# Patient Record
Sex: FEMALE | Race: WHITE | NOT HISPANIC OR LATINO | Employment: FULL TIME | URBAN - METROPOLITAN AREA
[De-identification: names, ages, dates, MRNs, and addresses within clinical notes are randomized per-mention and may not be internally consistent; named-entity substitution may affect disease eponyms.]

---

## 2017-01-30 ENCOUNTER — AMBULATORY - HEALTHEAST (OUTPATIENT)
Dept: LAB | Facility: CLINIC | Age: 37
End: 2017-01-30

## 2017-01-30 ENCOUNTER — COMMUNICATION - HEALTHEAST (OUTPATIENT)
Dept: INTERNAL MEDICINE | Facility: CLINIC | Age: 37
End: 2017-01-30

## 2017-01-30 DIAGNOSIS — R89.4 OTHER AND UNSPECIFIED NONSPECIFIC IMMUNOLOGICAL FINDINGS: ICD-10-CM

## 2017-01-30 DIAGNOSIS — D68.59 PRIMARY HYPERCOAGULABLE STATE (H): ICD-10-CM

## 2017-03-06 ENCOUNTER — COMMUNICATION - HEALTHEAST (OUTPATIENT)
Dept: INTERNAL MEDICINE | Facility: CLINIC | Age: 37
End: 2017-03-06

## 2017-03-06 ENCOUNTER — AMBULATORY - HEALTHEAST (OUTPATIENT)
Dept: LAB | Facility: CLINIC | Age: 37
End: 2017-03-06

## 2017-03-06 DIAGNOSIS — R89.4 OTHER AND UNSPECIFIED NONSPECIFIC IMMUNOLOGICAL FINDINGS: ICD-10-CM

## 2017-03-06 DIAGNOSIS — D68.59 PRIMARY HYPERCOAGULABLE STATE (H): ICD-10-CM

## 2017-03-20 ENCOUNTER — AMBULATORY - HEALTHEAST (OUTPATIENT)
Dept: LAB | Facility: CLINIC | Age: 37
End: 2017-03-20

## 2017-03-20 ENCOUNTER — COMMUNICATION - HEALTHEAST (OUTPATIENT)
Dept: INTERNAL MEDICINE | Facility: CLINIC | Age: 37
End: 2017-03-20

## 2017-03-20 DIAGNOSIS — R89.4 OTHER AND UNSPECIFIED NONSPECIFIC IMMUNOLOGICAL FINDINGS: ICD-10-CM

## 2017-03-20 DIAGNOSIS — D68.59 PRIMARY HYPERCOAGULABLE STATE (H): ICD-10-CM

## 2017-04-10 ENCOUNTER — COMMUNICATION - HEALTHEAST (OUTPATIENT)
Dept: INTERNAL MEDICINE | Facility: CLINIC | Age: 37
End: 2017-04-10

## 2017-04-10 ENCOUNTER — AMBULATORY - HEALTHEAST (OUTPATIENT)
Dept: LAB | Facility: CLINIC | Age: 37
End: 2017-04-10

## 2017-04-10 DIAGNOSIS — R89.4 OTHER AND UNSPECIFIED NONSPECIFIC IMMUNOLOGICAL FINDINGS: ICD-10-CM

## 2017-04-10 DIAGNOSIS — D68.59 PRIMARY HYPERCOAGULABLE STATE (H): ICD-10-CM

## 2017-05-19 ENCOUNTER — AMBULATORY - HEALTHEAST (OUTPATIENT)
Dept: LAB | Facility: CLINIC | Age: 37
End: 2017-05-19

## 2017-05-19 ENCOUNTER — COMMUNICATION - HEALTHEAST (OUTPATIENT)
Dept: INTERNAL MEDICINE | Facility: CLINIC | Age: 37
End: 2017-05-19

## 2017-05-19 DIAGNOSIS — R89.4 OTHER AND UNSPECIFIED NONSPECIFIC IMMUNOLOGICAL FINDINGS: ICD-10-CM

## 2017-05-19 DIAGNOSIS — D68.59 PRIMARY HYPERCOAGULABLE STATE (H): ICD-10-CM

## 2017-06-04 ENCOUNTER — COMMUNICATION - HEALTHEAST (OUTPATIENT)
Dept: INTERNAL MEDICINE | Facility: CLINIC | Age: 37
End: 2017-06-04

## 2017-06-04 DIAGNOSIS — D68.59 PRIMARY HYPERCOAGULABLE STATE (H): ICD-10-CM

## 2017-06-04 DIAGNOSIS — R89.4 OTHER AND UNSPECIFIED NONSPECIFIC IMMUNOLOGICAL FINDINGS: ICD-10-CM

## 2017-06-26 ENCOUNTER — COMMUNICATION - HEALTHEAST (OUTPATIENT)
Dept: INTERNAL MEDICINE | Facility: CLINIC | Age: 37
End: 2017-06-26

## 2017-06-26 ENCOUNTER — AMBULATORY - HEALTHEAST (OUTPATIENT)
Dept: LAB | Facility: CLINIC | Age: 37
End: 2017-06-26

## 2017-06-26 DIAGNOSIS — D68.59 PRIMARY HYPERCOAGULABLE STATE (H): ICD-10-CM

## 2017-06-26 DIAGNOSIS — R89.4 OTHER AND UNSPECIFIED NONSPECIFIC IMMUNOLOGICAL FINDINGS: ICD-10-CM

## 2017-07-12 ENCOUNTER — RECORDS - HEALTHEAST (OUTPATIENT)
Dept: ADMINISTRATIVE | Facility: OTHER | Age: 37
End: 2017-07-12

## 2017-07-25 ENCOUNTER — COMMUNICATION - HEALTHEAST (OUTPATIENT)
Dept: INTERNAL MEDICINE | Facility: CLINIC | Age: 37
End: 2017-07-25

## 2017-07-25 DIAGNOSIS — R89.4 OTHER AND UNSPECIFIED NONSPECIFIC IMMUNOLOGICAL FINDINGS: ICD-10-CM

## 2017-07-25 DIAGNOSIS — D68.59 PRIMARY HYPERCOAGULABLE STATE (H): ICD-10-CM

## 2017-07-31 ENCOUNTER — COMMUNICATION - HEALTHEAST (OUTPATIENT)
Dept: INTERNAL MEDICINE | Facility: CLINIC | Age: 37
End: 2017-07-31

## 2017-07-31 ENCOUNTER — AMBULATORY - HEALTHEAST (OUTPATIENT)
Dept: LAB | Facility: CLINIC | Age: 37
End: 2017-07-31

## 2017-07-31 DIAGNOSIS — D68.59 PRIMARY HYPERCOAGULABLE STATE (H): ICD-10-CM

## 2017-07-31 DIAGNOSIS — R89.4 OTHER AND UNSPECIFIED NONSPECIFIC IMMUNOLOGICAL FINDINGS: ICD-10-CM

## 2017-07-31 DIAGNOSIS — F33.1 MAJOR DEPRESSIVE DISORDER, RECURRENT EPISODE, MODERATE (H): ICD-10-CM

## 2017-07-31 LAB
CHOLEST SERPL-MCNC: 253 MG/DL
FASTING STATUS PATIENT QL REPORTED: YES
HDLC SERPL-MCNC: 63 MG/DL
LDLC SERPL CALC-MCNC: 169 MG/DL
TRIGL SERPL-MCNC: 107 MG/DL

## 2017-08-17 ENCOUNTER — AMBULATORY - HEALTHEAST (OUTPATIENT)
Dept: LAB | Facility: CLINIC | Age: 37
End: 2017-08-17

## 2017-08-17 ENCOUNTER — COMMUNICATION - HEALTHEAST (OUTPATIENT)
Dept: NURSING | Facility: CLINIC | Age: 37
End: 2017-08-17

## 2017-08-17 DIAGNOSIS — D68.59 PRIMARY HYPERCOAGULABLE STATE (H): ICD-10-CM

## 2017-08-17 DIAGNOSIS — R89.4 OTHER AND UNSPECIFIED NONSPECIFIC IMMUNOLOGICAL FINDINGS: ICD-10-CM

## 2017-08-30 ENCOUNTER — COMMUNICATION - HEALTHEAST (OUTPATIENT)
Dept: INTERNAL MEDICINE | Facility: CLINIC | Age: 37
End: 2017-08-30

## 2017-08-30 ENCOUNTER — OFFICE VISIT - HEALTHEAST (OUTPATIENT)
Dept: INTERNAL MEDICINE | Facility: CLINIC | Age: 37
End: 2017-08-30

## 2017-08-30 DIAGNOSIS — R89.4 OTHER AND UNSPECIFIED NONSPECIFIC IMMUNOLOGICAL FINDINGS: ICD-10-CM

## 2017-08-30 DIAGNOSIS — D68.59 PRIMARY HYPERCOAGULABLE STATE (H): ICD-10-CM

## 2017-08-30 DIAGNOSIS — F17.200 TOBACCO DEPENDENCE: ICD-10-CM

## 2017-08-30 ASSESSMENT — MIFFLIN-ST. JEOR: SCORE: 1298.66

## 2017-09-28 ENCOUNTER — COMMUNICATION - HEALTHEAST (OUTPATIENT)
Dept: INTERNAL MEDICINE | Facility: CLINIC | Age: 37
End: 2017-09-28

## 2017-10-04 ENCOUNTER — COMMUNICATION - HEALTHEAST (OUTPATIENT)
Dept: INTERNAL MEDICINE | Facility: CLINIC | Age: 37
End: 2017-10-04

## 2017-10-04 ENCOUNTER — AMBULATORY - HEALTHEAST (OUTPATIENT)
Dept: LAB | Facility: CLINIC | Age: 37
End: 2017-10-04

## 2017-10-04 DIAGNOSIS — D68.59 PRIMARY HYPERCOAGULABLE STATE (H): ICD-10-CM

## 2017-10-04 DIAGNOSIS — R89.4 OTHER AND UNSPECIFIED NONSPECIFIC IMMUNOLOGICAL FINDINGS: ICD-10-CM

## 2017-10-17 ENCOUNTER — AMBULATORY - HEALTHEAST (OUTPATIENT)
Dept: LAB | Facility: CLINIC | Age: 37
End: 2017-10-17

## 2017-10-17 ENCOUNTER — COMMUNICATION - HEALTHEAST (OUTPATIENT)
Dept: INTERNAL MEDICINE | Facility: CLINIC | Age: 37
End: 2017-10-17

## 2017-10-17 DIAGNOSIS — R89.4 OTHER AND UNSPECIFIED NONSPECIFIC IMMUNOLOGICAL FINDINGS: ICD-10-CM

## 2017-10-17 DIAGNOSIS — D68.59 PRIMARY HYPERCOAGULABLE STATE (H): ICD-10-CM

## 2017-11-14 ENCOUNTER — COMMUNICATION - HEALTHEAST (OUTPATIENT)
Dept: NURSING | Facility: CLINIC | Age: 37
End: 2017-11-14

## 2017-11-22 ENCOUNTER — COMMUNICATION - HEALTHEAST (OUTPATIENT)
Dept: INTERNAL MEDICINE | Facility: CLINIC | Age: 37
End: 2017-11-22

## 2017-11-22 ENCOUNTER — AMBULATORY - HEALTHEAST (OUTPATIENT)
Dept: LAB | Facility: CLINIC | Age: 37
End: 2017-11-22

## 2017-11-22 DIAGNOSIS — R89.4 OTHER AND UNSPECIFIED NONSPECIFIC IMMUNOLOGICAL FINDINGS: ICD-10-CM

## 2017-11-22 DIAGNOSIS — D68.59 PRIMARY HYPERCOAGULABLE STATE (H): ICD-10-CM

## 2018-01-05 ENCOUNTER — COMMUNICATION - HEALTHEAST (OUTPATIENT)
Dept: INTERNAL MEDICINE | Facility: CLINIC | Age: 38
End: 2018-01-05

## 2018-01-05 ENCOUNTER — AMBULATORY - HEALTHEAST (OUTPATIENT)
Dept: LAB | Facility: CLINIC | Age: 38
End: 2018-01-05

## 2018-01-05 DIAGNOSIS — R89.4 OTHER AND UNSPECIFIED NONSPECIFIC IMMUNOLOGICAL FINDINGS: ICD-10-CM

## 2018-01-05 DIAGNOSIS — D68.59 PRIMARY HYPERCOAGULABLE STATE (H): ICD-10-CM

## 2018-01-05 LAB — INR PPP: 2.7 (ref 0.9–1.1)

## 2018-02-07 ENCOUNTER — RECORDS - HEALTHEAST (OUTPATIENT)
Dept: ADMINISTRATIVE | Facility: OTHER | Age: 38
End: 2018-02-07

## 2018-02-12 ENCOUNTER — AMBULATORY - HEALTHEAST (OUTPATIENT)
Dept: LAB | Facility: CLINIC | Age: 38
End: 2018-02-12

## 2018-02-12 DIAGNOSIS — F50.9 ATYPICAL ANOREXIA NERVOSA: ICD-10-CM

## 2018-02-14 ENCOUNTER — AMBULATORY - HEALTHEAST (OUTPATIENT)
Dept: LAB | Facility: CLINIC | Age: 38
End: 2018-02-14

## 2018-02-14 ENCOUNTER — COMMUNICATION - HEALTHEAST (OUTPATIENT)
Dept: INTERNAL MEDICINE | Facility: CLINIC | Age: 38
End: 2018-02-14

## 2018-02-14 DIAGNOSIS — R89.4 OTHER AND UNSPECIFIED NONSPECIFIC IMMUNOLOGICAL FINDINGS: ICD-10-CM

## 2018-02-14 DIAGNOSIS — D68.59 PRIMARY HYPERCOAGULABLE STATE (H): ICD-10-CM

## 2018-02-14 DIAGNOSIS — F50.9 ATYPICAL ANOREXIA NERVOSA: ICD-10-CM

## 2018-02-14 LAB
ALBUMIN SERPL-MCNC: 4 G/DL (ref 3.5–5)
ALP SERPL-CCNC: 60 U/L (ref 45–120)
ALT SERPL W P-5'-P-CCNC: 14 U/L (ref 0–45)
ANION GAP SERPL CALCULATED.3IONS-SCNC: 6 MMOL/L (ref 5–18)
AST SERPL W P-5'-P-CCNC: 15 U/L (ref 0–40)
BASOPHILS # BLD AUTO: 0 THOU/UL (ref 0–0.2)
BASOPHILS NFR BLD AUTO: 0 % (ref 0–2)
BILIRUB SERPL-MCNC: 0.3 MG/DL (ref 0–1)
BUN SERPL-MCNC: 17 MG/DL (ref 8–22)
CALCIUM SERPL-MCNC: 9.4 MG/DL (ref 8.5–10.5)
CHLORIDE BLD-SCNC: 105 MMOL/L (ref 98–107)
CO2 SERPL-SCNC: 28 MMOL/L (ref 22–31)
CREAT SERPL-MCNC: 0.73 MG/DL (ref 0.6–1.1)
EOSINOPHIL # BLD AUTO: 0.1 THOU/UL (ref 0–0.4)
EOSINOPHIL NFR BLD AUTO: 3 % (ref 0–6)
ERYTHROCYTE [DISTWIDTH] IN BLOOD BY AUTOMATED COUNT: 12.1 % (ref 11–14.5)
GFR SERPL CREATININE-BSD FRML MDRD: >60 ML/MIN/1.73M2
GLUCOSE BLD-MCNC: 76 MG/DL (ref 70–125)
HCT VFR BLD AUTO: 38.8 % (ref 35–47)
HGB BLD-MCNC: 13.3 G/DL (ref 12–16)
INR PPP: 2.5 (ref 0.9–1.1)
LYMPHOCYTES # BLD AUTO: 1.4 THOU/UL (ref 0.8–4.4)
LYMPHOCYTES NFR BLD AUTO: 31 % (ref 20–40)
MAGNESIUM SERPL-MCNC: 1.8 MG/DL (ref 1.8–2.6)
MCH RBC QN AUTO: 29.2 PG (ref 27–34)
MCHC RBC AUTO-ENTMCNC: 34.2 G/DL (ref 32–36)
MCV RBC AUTO: 85 FL (ref 80–100)
MONOCYTES # BLD AUTO: 0.3 THOU/UL (ref 0–0.9)
MONOCYTES NFR BLD AUTO: 7 % (ref 2–10)
NEUTROPHILS # BLD AUTO: 2.7 THOU/UL (ref 2–7.7)
NEUTROPHILS NFR BLD AUTO: 59 % (ref 50–70)
PLATELET # BLD AUTO: 161 THOU/UL (ref 140–440)
PMV BLD AUTO: 7.4 FL (ref 7–10)
POTASSIUM BLD-SCNC: 4.3 MMOL/L (ref 3.5–5)
PROT SERPL-MCNC: 6.8 G/DL (ref 6–8)
RBC # BLD AUTO: 4.55 MILL/UL (ref 3.8–5.4)
SODIUM SERPL-SCNC: 139 MMOL/L (ref 136–145)
T3FREE SERPL-MCNC: 3 PG/ML (ref 1.9–3.9)
T4 FREE SERPL-MCNC: 1 NG/DL (ref 0.7–1.8)
TSH SERPL DL<=0.005 MIU/L-ACNC: 1.07 UIU/ML (ref 0.3–5)
VIT B12 SERPL-MCNC: 433 PG/ML (ref 213–816)
WBC: 4.6 THOU/UL (ref 4–11)

## 2018-02-15 LAB — 25(OH)D3 SERPL-MCNC: 21.2 NG/ML (ref 30–80)

## 2018-04-08 ENCOUNTER — COMMUNICATION - HEALTHEAST (OUTPATIENT)
Dept: INTERNAL MEDICINE | Facility: CLINIC | Age: 38
End: 2018-04-08

## 2018-04-08 DIAGNOSIS — D68.59 PRIMARY HYPERCOAGULABLE STATE (H): ICD-10-CM

## 2018-04-11 ENCOUNTER — AMBULATORY - HEALTHEAST (OUTPATIENT)
Dept: LAB | Facility: CLINIC | Age: 38
End: 2018-04-11

## 2018-04-11 ENCOUNTER — COMMUNICATION - HEALTHEAST (OUTPATIENT)
Dept: ANTICOAGULATION | Facility: CLINIC | Age: 38
End: 2018-04-11

## 2018-04-11 DIAGNOSIS — D68.59 PRIMARY HYPERCOAGULABLE STATE (H): ICD-10-CM

## 2018-04-11 DIAGNOSIS — R89.4 OTHER AND UNSPECIFIED NONSPECIFIC IMMUNOLOGICAL FINDINGS: ICD-10-CM

## 2018-04-11 LAB — INR PPP: 2.9 (ref 0.9–1.1)

## 2018-05-23 ENCOUNTER — AMBULATORY - HEALTHEAST (OUTPATIENT)
Dept: LAB | Facility: CLINIC | Age: 38
End: 2018-05-23

## 2018-05-23 ENCOUNTER — COMMUNICATION - HEALTHEAST (OUTPATIENT)
Dept: ANTICOAGULATION | Facility: CLINIC | Age: 38
End: 2018-05-23

## 2018-05-23 DIAGNOSIS — R89.4 OTHER AND UNSPECIFIED NONSPECIFIC IMMUNOLOGICAL FINDINGS: ICD-10-CM

## 2018-05-23 DIAGNOSIS — D68.59 PRIMARY HYPERCOAGULABLE STATE (H): ICD-10-CM

## 2018-05-23 LAB — INR PPP: 2.4 (ref 0.9–1.1)

## 2018-06-19 ENCOUNTER — COMMUNICATION - HEALTHEAST (OUTPATIENT)
Dept: ANTICOAGULATION | Facility: CLINIC | Age: 38
End: 2018-06-19

## 2018-06-19 DIAGNOSIS — D68.61 ANTIPHOSPHOLIPID ANTIBODY SYNDROME (H): ICD-10-CM

## 2018-06-19 DIAGNOSIS — D68.59 PRIMARY HYPERCOAGULABLE STATE (H): ICD-10-CM

## 2018-06-25 ENCOUNTER — COMMUNICATION - HEALTHEAST (OUTPATIENT)
Dept: LAB | Facility: CLINIC | Age: 38
End: 2018-06-25

## 2018-06-27 ENCOUNTER — AMBULATORY - HEALTHEAST (OUTPATIENT)
Dept: LAB | Facility: CLINIC | Age: 38
End: 2018-06-27

## 2018-06-27 ENCOUNTER — COMMUNICATION - HEALTHEAST (OUTPATIENT)
Dept: INTERNAL MEDICINE | Facility: CLINIC | Age: 38
End: 2018-06-27

## 2018-06-27 ENCOUNTER — COMMUNICATION - HEALTHEAST (OUTPATIENT)
Dept: ANTICOAGULATION | Facility: CLINIC | Age: 38
End: 2018-06-27

## 2018-06-27 DIAGNOSIS — D68.61 ANTIPHOSPHOLIPID ANTIBODY SYNDROME (H): ICD-10-CM

## 2018-06-27 DIAGNOSIS — D68.59 PRIMARY HYPERCOAGULABLE STATE (H): ICD-10-CM

## 2018-06-27 LAB — INR PPP: 2.6 (ref 0.9–1.1)

## 2018-08-15 ENCOUNTER — COMMUNICATION - HEALTHEAST (OUTPATIENT)
Dept: ANTICOAGULATION | Facility: CLINIC | Age: 38
End: 2018-08-15

## 2018-08-15 ENCOUNTER — AMBULATORY - HEALTHEAST (OUTPATIENT)
Dept: LAB | Facility: CLINIC | Age: 38
End: 2018-08-15

## 2018-08-15 DIAGNOSIS — D68.59 PRIMARY HYPERCOAGULABLE STATE (H): ICD-10-CM

## 2018-08-15 DIAGNOSIS — D68.61 ANTIPHOSPHOLIPID ANTIBODY SYNDROME (H): ICD-10-CM

## 2018-08-15 LAB — INR PPP: 3.2 (ref 0.9–1.1)

## 2018-09-06 ENCOUNTER — COMMUNICATION - HEALTHEAST (OUTPATIENT)
Dept: ANTICOAGULATION | Facility: CLINIC | Age: 38
End: 2018-09-06

## 2018-09-07 ENCOUNTER — AMBULATORY - HEALTHEAST (OUTPATIENT)
Dept: LAB | Facility: CLINIC | Age: 38
End: 2018-09-07

## 2018-09-07 ENCOUNTER — COMMUNICATION - HEALTHEAST (OUTPATIENT)
Dept: ANTICOAGULATION | Facility: CLINIC | Age: 38
End: 2018-09-07

## 2018-09-07 DIAGNOSIS — D68.59 PRIMARY HYPERCOAGULABLE STATE (H): ICD-10-CM

## 2018-09-07 DIAGNOSIS — D68.61 ANTIPHOSPHOLIPID ANTIBODY SYNDROME (H): ICD-10-CM

## 2018-09-07 LAB — INR PPP: 2.3 (ref 0.9–1.1)

## 2018-10-03 ENCOUNTER — COMMUNICATION - HEALTHEAST (OUTPATIENT)
Dept: ANTICOAGULATION | Facility: CLINIC | Age: 38
End: 2018-10-03

## 2018-10-03 ENCOUNTER — AMBULATORY - HEALTHEAST (OUTPATIENT)
Dept: LAB | Facility: CLINIC | Age: 38
End: 2018-10-03

## 2018-10-03 DIAGNOSIS — D68.61 ANTIPHOSPHOLIPID ANTIBODY SYNDROME (H): ICD-10-CM

## 2018-10-03 DIAGNOSIS — D68.59 PRIMARY HYPERCOAGULABLE STATE (H): ICD-10-CM

## 2018-10-03 LAB — INR PPP: 2.3 (ref 0.9–1.1)

## 2018-11-21 ENCOUNTER — COMMUNICATION - HEALTHEAST (OUTPATIENT)
Dept: ANTICOAGULATION | Facility: CLINIC | Age: 38
End: 2018-11-21

## 2018-11-21 ENCOUNTER — OFFICE VISIT - HEALTHEAST (OUTPATIENT)
Dept: INTERNAL MEDICINE | Facility: CLINIC | Age: 38
End: 2018-11-21

## 2018-11-21 DIAGNOSIS — D68.59 PRIMARY HYPERCOAGULABLE STATE (H): ICD-10-CM

## 2018-11-21 DIAGNOSIS — D68.61 ANTIPHOSPHOLIPID ANTIBODY SYNDROME (H): ICD-10-CM

## 2018-11-21 LAB — INR PPP: 2.1 (ref 0.9–1.1)

## 2018-11-21 ASSESSMENT — MIFFLIN-ST. JEOR: SCORE: 1282.28

## 2019-01-14 ENCOUNTER — AMBULATORY - HEALTHEAST (OUTPATIENT)
Dept: LAB | Facility: CLINIC | Age: 39
End: 2019-01-14

## 2019-01-14 ENCOUNTER — COMMUNICATION - HEALTHEAST (OUTPATIENT)
Dept: ANTICOAGULATION | Facility: CLINIC | Age: 39
End: 2019-01-14

## 2019-01-14 DIAGNOSIS — D68.59 PRIMARY HYPERCOAGULABLE STATE (H): ICD-10-CM

## 2019-01-14 DIAGNOSIS — D68.61 ANTIPHOSPHOLIPID ANTIBODY SYNDROME (H): ICD-10-CM

## 2019-01-14 LAB — INR PPP: 1.7 (ref 0.9–1.1)

## 2019-02-04 ENCOUNTER — COMMUNICATION - HEALTHEAST (OUTPATIENT)
Dept: ANTICOAGULATION | Facility: CLINIC | Age: 39
End: 2019-02-04

## 2019-02-06 ENCOUNTER — COMMUNICATION - HEALTHEAST (OUTPATIENT)
Dept: ANTICOAGULATION | Facility: CLINIC | Age: 39
End: 2019-02-06

## 2019-02-06 ENCOUNTER — AMBULATORY - HEALTHEAST (OUTPATIENT)
Dept: LAB | Facility: CLINIC | Age: 39
End: 2019-02-06

## 2019-02-06 DIAGNOSIS — D68.59 PRIMARY HYPERCOAGULABLE STATE (H): ICD-10-CM

## 2019-02-06 DIAGNOSIS — D68.61 ANTIPHOSPHOLIPID ANTIBODY SYNDROME (H): ICD-10-CM

## 2019-02-06 LAB — INR PPP: 2.4 (ref 0.9–1.1)

## 2019-02-27 ENCOUNTER — AMBULATORY - HEALTHEAST (OUTPATIENT)
Dept: LAB | Facility: CLINIC | Age: 39
End: 2019-02-27

## 2019-02-27 ENCOUNTER — COMMUNICATION - HEALTHEAST (OUTPATIENT)
Dept: ANTICOAGULATION | Facility: CLINIC | Age: 39
End: 2019-02-27

## 2019-02-27 DIAGNOSIS — D68.61 ANTIPHOSPHOLIPID ANTIBODY SYNDROME (H): ICD-10-CM

## 2019-02-27 DIAGNOSIS — D68.59 PRIMARY HYPERCOAGULABLE STATE (H): ICD-10-CM

## 2019-02-27 LAB — INR PPP: 1.7 (ref 0.9–1.1)

## 2019-03-20 ENCOUNTER — COMMUNICATION - HEALTHEAST (OUTPATIENT)
Dept: ANTICOAGULATION | Facility: CLINIC | Age: 39
End: 2019-03-20

## 2019-03-20 ENCOUNTER — AMBULATORY - HEALTHEAST (OUTPATIENT)
Dept: LAB | Facility: CLINIC | Age: 39
End: 2019-03-20

## 2019-03-20 DIAGNOSIS — D68.61 ANTIPHOSPHOLIPID ANTIBODY SYNDROME (H): ICD-10-CM

## 2019-03-20 DIAGNOSIS — D68.59 PRIMARY HYPERCOAGULABLE STATE (H): ICD-10-CM

## 2019-03-20 LAB — INR PPP: 3 (ref 0.9–1.1)

## 2019-04-09 ENCOUNTER — COMMUNICATION - HEALTHEAST (OUTPATIENT)
Dept: INTERNAL MEDICINE | Facility: CLINIC | Age: 39
End: 2019-04-09

## 2019-04-19 ENCOUNTER — COMMUNICATION - HEALTHEAST (OUTPATIENT)
Dept: INTERNAL MEDICINE | Facility: CLINIC | Age: 39
End: 2019-04-19

## 2019-04-24 ENCOUNTER — COMMUNICATION - HEALTHEAST (OUTPATIENT)
Dept: ANTICOAGULATION | Facility: CLINIC | Age: 39
End: 2019-04-24

## 2019-04-24 ENCOUNTER — AMBULATORY - HEALTHEAST (OUTPATIENT)
Dept: LAB | Facility: CLINIC | Age: 39
End: 2019-04-24

## 2019-04-24 DIAGNOSIS — D68.61 ANTIPHOSPHOLIPID ANTIBODY SYNDROME (H): ICD-10-CM

## 2019-04-24 DIAGNOSIS — D68.59 PRIMARY HYPERCOAGULABLE STATE (H): ICD-10-CM

## 2019-04-24 LAB — INR PPP: 2.8 (ref 0.9–1.1)

## 2019-05-29 ENCOUNTER — COMMUNICATION - HEALTHEAST (OUTPATIENT)
Dept: ANTICOAGULATION | Facility: CLINIC | Age: 39
End: 2019-05-29

## 2019-05-29 DIAGNOSIS — D68.59 PRIMARY HYPERCOAGULABLE STATE (H): ICD-10-CM

## 2019-05-29 DIAGNOSIS — D68.61 ANTIPHOSPHOLIPID ANTIBODY SYNDROME (H): ICD-10-CM

## 2019-06-14 ENCOUNTER — COMMUNICATION - HEALTHEAST (OUTPATIENT)
Dept: ANTICOAGULATION | Facility: CLINIC | Age: 39
End: 2019-06-14

## 2019-06-26 ENCOUNTER — AMBULATORY - HEALTHEAST (OUTPATIENT)
Dept: LAB | Facility: CLINIC | Age: 39
End: 2019-06-26

## 2019-06-26 ENCOUNTER — COMMUNICATION - HEALTHEAST (OUTPATIENT)
Dept: ANTICOAGULATION | Facility: CLINIC | Age: 39
End: 2019-06-26

## 2019-06-26 DIAGNOSIS — D68.61 ANTIPHOSPHOLIPID ANTIBODY SYNDROME (H): ICD-10-CM

## 2019-06-26 DIAGNOSIS — D68.59 PRIMARY HYPERCOAGULABLE STATE (H): ICD-10-CM

## 2019-06-26 LAB — INR PPP: 3.9 (ref 0.9–1.1)

## 2019-07-10 ENCOUNTER — COMMUNICATION - HEALTHEAST (OUTPATIENT)
Dept: ANTICOAGULATION | Facility: CLINIC | Age: 39
End: 2019-07-10

## 2019-07-10 ENCOUNTER — AMBULATORY - HEALTHEAST (OUTPATIENT)
Dept: LAB | Facility: CLINIC | Age: 39
End: 2019-07-10

## 2019-07-10 DIAGNOSIS — D68.61 ANTIPHOSPHOLIPID ANTIBODY SYNDROME (H): ICD-10-CM

## 2019-07-10 DIAGNOSIS — D68.59 PRIMARY HYPERCOAGULABLE STATE (H): ICD-10-CM

## 2019-07-10 LAB — INR PPP: 2.2 (ref 0.9–1.1)

## 2019-07-31 ENCOUNTER — COMMUNICATION - HEALTHEAST (OUTPATIENT)
Dept: ANTICOAGULATION | Facility: CLINIC | Age: 39
End: 2019-07-31

## 2019-08-14 ENCOUNTER — AMBULATORY - HEALTHEAST (OUTPATIENT)
Dept: LAB | Facility: CLINIC | Age: 39
End: 2019-08-14

## 2019-08-14 ENCOUNTER — COMMUNICATION - HEALTHEAST (OUTPATIENT)
Dept: ANTICOAGULATION | Facility: CLINIC | Age: 39
End: 2019-08-14

## 2019-08-14 DIAGNOSIS — D68.59 PRIMARY HYPERCOAGULABLE STATE (H): ICD-10-CM

## 2019-08-14 DIAGNOSIS — D68.61 ANTIPHOSPHOLIPID ANTIBODY SYNDROME (H): ICD-10-CM

## 2019-08-14 LAB — INR PPP: 3.8 (ref 0.9–1.1)

## 2019-09-11 ENCOUNTER — AMBULATORY - HEALTHEAST (OUTPATIENT)
Dept: LAB | Facility: CLINIC | Age: 39
End: 2019-09-11

## 2019-09-11 ENCOUNTER — COMMUNICATION - HEALTHEAST (OUTPATIENT)
Dept: ANTICOAGULATION | Facility: CLINIC | Age: 39
End: 2019-09-11

## 2019-09-11 DIAGNOSIS — D68.61 ANTIPHOSPHOLIPID ANTIBODY SYNDROME (H): ICD-10-CM

## 2019-09-11 DIAGNOSIS — D68.59 PRIMARY HYPERCOAGULABLE STATE (H): ICD-10-CM

## 2019-09-11 LAB — INR PPP: 2.4 (ref 0.9–1.1)

## 2019-10-16 ENCOUNTER — COMMUNICATION - HEALTHEAST (OUTPATIENT)
Dept: ANTICOAGULATION | Facility: CLINIC | Age: 39
End: 2019-10-16

## 2019-10-29 ENCOUNTER — COMMUNICATION - HEALTHEAST (OUTPATIENT)
Dept: INTERNAL MEDICINE | Facility: CLINIC | Age: 39
End: 2019-10-29

## 2019-11-01 ENCOUNTER — AMBULATORY - HEALTHEAST (OUTPATIENT)
Dept: LAB | Facility: CLINIC | Age: 39
End: 2019-11-01

## 2019-11-01 ENCOUNTER — COMMUNICATION - HEALTHEAST (OUTPATIENT)
Dept: ANTICOAGULATION | Facility: CLINIC | Age: 39
End: 2019-11-01

## 2019-11-01 DIAGNOSIS — D68.61 ANTIPHOSPHOLIPID ANTIBODY SYNDROME (H): ICD-10-CM

## 2019-11-01 DIAGNOSIS — D68.59 PRIMARY HYPERCOAGULABLE STATE (H): ICD-10-CM

## 2019-11-01 LAB — INR PPP: 2.7 (ref 0.9–1.1)

## 2019-11-29 ENCOUNTER — COMMUNICATION - HEALTHEAST (OUTPATIENT)
Dept: INTERNAL MEDICINE | Facility: CLINIC | Age: 39
End: 2019-11-29

## 2019-12-13 ENCOUNTER — COMMUNICATION - HEALTHEAST (OUTPATIENT)
Dept: ANTICOAGULATION | Facility: CLINIC | Age: 39
End: 2019-12-13

## 2019-12-23 ENCOUNTER — COMMUNICATION - HEALTHEAST (OUTPATIENT)
Dept: ANTICOAGULATION | Facility: CLINIC | Age: 39
End: 2019-12-23

## 2019-12-23 ENCOUNTER — AMBULATORY - HEALTHEAST (OUTPATIENT)
Dept: LAB | Facility: CLINIC | Age: 39
End: 2019-12-23

## 2019-12-23 DIAGNOSIS — D68.59 PRIMARY HYPERCOAGULABLE STATE (H): ICD-10-CM

## 2019-12-23 DIAGNOSIS — D68.61 ANTIPHOSPHOLIPID ANTIBODY SYNDROME (H): ICD-10-CM

## 2019-12-23 LAB — INR PPP: 2.5 (ref 0.9–1.1)

## 2020-02-19 ENCOUNTER — AMBULATORY - HEALTHEAST (OUTPATIENT)
Dept: LAB | Facility: CLINIC | Age: 40
End: 2020-02-19

## 2020-02-19 ENCOUNTER — COMMUNICATION - HEALTHEAST (OUTPATIENT)
Dept: ANTICOAGULATION | Facility: CLINIC | Age: 40
End: 2020-02-19

## 2020-02-19 DIAGNOSIS — D68.59 PRIMARY HYPERCOAGULABLE STATE (H): ICD-10-CM

## 2020-02-19 DIAGNOSIS — D68.61 ANTIPHOSPHOLIPID ANTIBODY SYNDROME (H): ICD-10-CM

## 2020-02-19 LAB — INR PPP: 4.9 (ref 0.9–1.1)

## 2020-03-04 ENCOUNTER — AMBULATORY - HEALTHEAST (OUTPATIENT)
Dept: LAB | Facility: CLINIC | Age: 40
End: 2020-03-04

## 2020-03-04 ENCOUNTER — COMMUNICATION - HEALTHEAST (OUTPATIENT)
Dept: ANTICOAGULATION | Facility: CLINIC | Age: 40
End: 2020-03-04

## 2020-03-04 DIAGNOSIS — D68.61 ANTIPHOSPHOLIPID ANTIBODY SYNDROME (H): ICD-10-CM

## 2020-03-04 DIAGNOSIS — D68.59 PRIMARY HYPERCOAGULABLE STATE (H): ICD-10-CM

## 2020-03-04 LAB — INR PPP: 2.2 (ref 0.9–1.1)

## 2020-04-08 ENCOUNTER — AMBULATORY - HEALTHEAST (OUTPATIENT)
Dept: LAB | Facility: CLINIC | Age: 40
End: 2020-04-08

## 2020-04-08 ENCOUNTER — COMMUNICATION - HEALTHEAST (OUTPATIENT)
Dept: ANTICOAGULATION | Facility: CLINIC | Age: 40
End: 2020-04-08

## 2020-04-08 DIAGNOSIS — D68.59 PRIMARY HYPERCOAGULABLE STATE (H): ICD-10-CM

## 2020-04-08 DIAGNOSIS — D68.61 ANTIPHOSPHOLIPID ANTIBODY SYNDROME (H): ICD-10-CM

## 2020-04-08 LAB — INR PPP: 2.7 (ref 0.9–1.1)

## 2020-04-15 ENCOUNTER — COMMUNICATION - HEALTHEAST (OUTPATIENT)
Dept: ANTICOAGULATION | Facility: CLINIC | Age: 40
End: 2020-04-15

## 2020-04-16 ENCOUNTER — COMMUNICATION - HEALTHEAST (OUTPATIENT)
Dept: ANTICOAGULATION | Facility: CLINIC | Age: 40
End: 2020-04-16

## 2020-05-12 ENCOUNTER — COMMUNICATION - HEALTHEAST (OUTPATIENT)
Dept: SCHEDULING | Facility: CLINIC | Age: 40
End: 2020-05-12

## 2020-05-13 ENCOUNTER — COMMUNICATION - HEALTHEAST (OUTPATIENT)
Dept: ANTICOAGULATION | Facility: CLINIC | Age: 40
End: 2020-05-13

## 2020-05-26 ENCOUNTER — COMMUNICATION - HEALTHEAST (OUTPATIENT)
Dept: SCHEDULING | Facility: CLINIC | Age: 40
End: 2020-05-26

## 2020-05-27 ENCOUNTER — OFFICE VISIT - HEALTHEAST (OUTPATIENT)
Dept: INTERNAL MEDICINE | Facility: CLINIC | Age: 40
End: 2020-05-27

## 2020-05-27 ENCOUNTER — COMMUNICATION - HEALTHEAST (OUTPATIENT)
Dept: LAB | Facility: CLINIC | Age: 40
End: 2020-05-27

## 2020-05-27 DIAGNOSIS — D68.59 PRIMARY HYPERCOAGULABLE STATE (H): ICD-10-CM

## 2020-05-27 DIAGNOSIS — Z13.220 SCREENING CHOLESTEROL LEVEL: ICD-10-CM

## 2020-05-27 DIAGNOSIS — F41.1 ANXIETY, GENERALIZED: ICD-10-CM

## 2020-05-27 DIAGNOSIS — D68.61 ANTIPHOSPHOLIPID ANTIBODY SYNDROME (H): ICD-10-CM

## 2020-05-27 DIAGNOSIS — F33.9 RECURRENT MAJOR DEPRESSIVE DISORDER, REMISSION STATUS UNSPECIFIED (H): ICD-10-CM

## 2020-05-27 DIAGNOSIS — Z13.1 SCREENING FOR DIABETES MELLITUS: ICD-10-CM

## 2020-05-27 ASSESSMENT — PATIENT HEALTH QUESTIONNAIRE - PHQ9: SUM OF ALL RESPONSES TO PHQ QUESTIONS 1-9: 0

## 2020-06-03 ENCOUNTER — AMBULATORY - HEALTHEAST (OUTPATIENT)
Dept: LAB | Facility: CLINIC | Age: 40
End: 2020-06-03

## 2020-06-03 ENCOUNTER — COMMUNICATION - HEALTHEAST (OUTPATIENT)
Dept: ANTICOAGULATION | Facility: CLINIC | Age: 40
End: 2020-06-03

## 2020-06-03 DIAGNOSIS — D68.61 ANTIPHOSPHOLIPID ANTIBODY SYNDROME (H): ICD-10-CM

## 2020-06-03 DIAGNOSIS — D68.59 PRIMARY HYPERCOAGULABLE STATE (H): ICD-10-CM

## 2020-06-03 LAB — INR PPP: 1.9 (ref 0.9–1.1)

## 2020-06-24 ENCOUNTER — COMMUNICATION - HEALTHEAST (OUTPATIENT)
Dept: ANTICOAGULATION | Facility: CLINIC | Age: 40
End: 2020-06-24

## 2020-07-08 ENCOUNTER — COMMUNICATION - HEALTHEAST (OUTPATIENT)
Dept: ANTICOAGULATION | Facility: CLINIC | Age: 40
End: 2020-07-08

## 2020-07-08 ENCOUNTER — AMBULATORY - HEALTHEAST (OUTPATIENT)
Dept: LAB | Facility: CLINIC | Age: 40
End: 2020-07-08

## 2020-07-08 DIAGNOSIS — D68.61 ANTIPHOSPHOLIPID ANTIBODY SYNDROME (H): ICD-10-CM

## 2020-07-08 DIAGNOSIS — D68.59 PRIMARY HYPERCOAGULABLE STATE (H): ICD-10-CM

## 2020-07-08 LAB — INR PPP: 3.5 (ref 0.9–1.1)

## 2020-08-05 ENCOUNTER — COMMUNICATION - HEALTHEAST (OUTPATIENT)
Dept: ANTICOAGULATION | Facility: CLINIC | Age: 40
End: 2020-08-05

## 2020-08-05 ENCOUNTER — AMBULATORY - HEALTHEAST (OUTPATIENT)
Dept: LAB | Facility: CLINIC | Age: 40
End: 2020-08-05

## 2020-08-05 DIAGNOSIS — D68.59 PRIMARY HYPERCOAGULABLE STATE (H): ICD-10-CM

## 2020-08-05 DIAGNOSIS — D68.61 ANTIPHOSPHOLIPID ANTIBODY SYNDROME (H): ICD-10-CM

## 2020-08-05 LAB — INR PPP: 3 (ref 0.9–1.1)

## 2020-09-15 ENCOUNTER — COMMUNICATION - HEALTHEAST (OUTPATIENT)
Dept: ANTICOAGULATION | Facility: CLINIC | Age: 40
End: 2020-09-15

## 2020-09-23 ENCOUNTER — AMBULATORY - HEALTHEAST (OUTPATIENT)
Dept: LAB | Facility: CLINIC | Age: 40
End: 2020-09-23

## 2020-09-23 ENCOUNTER — COMMUNICATION - HEALTHEAST (OUTPATIENT)
Dept: ANTICOAGULATION | Facility: CLINIC | Age: 40
End: 2020-09-23

## 2020-09-23 DIAGNOSIS — D68.59 PRIMARY HYPERCOAGULABLE STATE (H): ICD-10-CM

## 2020-09-23 DIAGNOSIS — D68.61 ANTIPHOSPHOLIPID ANTIBODY SYNDROME (H): ICD-10-CM

## 2020-09-23 LAB — INR PPP: 2.1 (ref 0.9–1.1)

## 2020-11-12 ENCOUNTER — COMMUNICATION - HEALTHEAST (OUTPATIENT)
Dept: ANTICOAGULATION | Facility: CLINIC | Age: 40
End: 2020-11-12

## 2020-12-02 ENCOUNTER — AMBULATORY - HEALTHEAST (OUTPATIENT)
Dept: LAB | Facility: CLINIC | Age: 40
End: 2020-12-02

## 2020-12-02 ENCOUNTER — COMMUNICATION - HEALTHEAST (OUTPATIENT)
Dept: ANTICOAGULATION | Facility: CLINIC | Age: 40
End: 2020-12-02

## 2020-12-02 DIAGNOSIS — D68.61 ANTIPHOSPHOLIPID ANTIBODY SYNDROME (H): ICD-10-CM

## 2020-12-02 DIAGNOSIS — D68.59 PRIMARY HYPERCOAGULABLE STATE (H): ICD-10-CM

## 2020-12-02 LAB — INR PPP: 3.4 (ref 0.9–1.1)

## 2021-01-06 ENCOUNTER — AMBULATORY - HEALTHEAST (OUTPATIENT)
Dept: LAB | Facility: CLINIC | Age: 41
End: 2021-01-06

## 2021-01-06 ENCOUNTER — AMBULATORY - HEALTHEAST (OUTPATIENT)
Dept: ANTICOAGULATION | Facility: CLINIC | Age: 41
End: 2021-01-06

## 2021-01-06 ENCOUNTER — COMMUNICATION - HEALTHEAST (OUTPATIENT)
Dept: ANTICOAGULATION | Facility: CLINIC | Age: 41
End: 2021-01-06

## 2021-01-06 DIAGNOSIS — D68.61 ANTIPHOSPHOLIPID ANTIBODY SYNDROME (H): ICD-10-CM

## 2021-01-06 DIAGNOSIS — D68.59 PRIMARY HYPERCOAGULABLE STATE (H): ICD-10-CM

## 2021-01-06 LAB — INR PPP: 2.9 (ref 0.9–1.1)

## 2021-02-03 ENCOUNTER — OFFICE VISIT - HEALTHEAST (OUTPATIENT)
Dept: INTERNAL MEDICINE | Facility: CLINIC | Age: 41
End: 2021-02-03

## 2021-02-03 DIAGNOSIS — K52.9 GASTROENTERITIS: ICD-10-CM

## 2021-02-03 ASSESSMENT — MIFFLIN-ST. JEOR: SCORE: 1332.84

## 2021-02-17 ENCOUNTER — COMMUNICATION - HEALTHEAST (OUTPATIENT)
Dept: ANTICOAGULATION | Facility: CLINIC | Age: 41
End: 2021-02-17

## 2021-02-17 ENCOUNTER — AMBULATORY - HEALTHEAST (OUTPATIENT)
Dept: LAB | Facility: CLINIC | Age: 41
End: 2021-02-17

## 2021-02-17 DIAGNOSIS — D68.59 PRIMARY HYPERCOAGULABLE STATE (H): ICD-10-CM

## 2021-02-17 DIAGNOSIS — D68.61 ANTIPHOSPHOLIPID ANTIBODY SYNDROME (H): ICD-10-CM

## 2021-02-17 LAB — INR PPP: 1.7 (ref 0.9–1.1)

## 2021-03-10 ENCOUNTER — COMMUNICATION - HEALTHEAST (OUTPATIENT)
Dept: ANTICOAGULATION | Facility: CLINIC | Age: 41
End: 2021-03-10

## 2021-03-19 ENCOUNTER — COMMUNICATION - HEALTHEAST (OUTPATIENT)
Dept: ANTICOAGULATION | Facility: CLINIC | Age: 41
End: 2021-03-19

## 2021-03-19 ENCOUNTER — AMBULATORY - HEALTHEAST (OUTPATIENT)
Dept: LAB | Facility: CLINIC | Age: 41
End: 2021-03-19

## 2021-03-19 DIAGNOSIS — D68.61 ANTIPHOSPHOLIPID ANTIBODY SYNDROME (H): ICD-10-CM

## 2021-03-19 DIAGNOSIS — D68.59 PRIMARY HYPERCOAGULABLE STATE (H): ICD-10-CM

## 2021-03-19 LAB — INR PPP: 2.6 (ref 0.9–1.1)

## 2021-04-23 ENCOUNTER — COMMUNICATION - HEALTHEAST (OUTPATIENT)
Dept: ANTICOAGULATION | Facility: CLINIC | Age: 41
End: 2021-04-23

## 2021-04-28 ENCOUNTER — AMBULATORY - HEALTHEAST (OUTPATIENT)
Dept: ANTICOAGULATION | Facility: CLINIC | Age: 41
End: 2021-04-28

## 2021-04-28 DIAGNOSIS — D68.59 PRIMARY HYPERCOAGULABLE STATE (H): ICD-10-CM

## 2021-05-04 ENCOUNTER — COMMUNICATION - HEALTHEAST (OUTPATIENT)
Dept: INTERNAL MEDICINE | Facility: CLINIC | Age: 41
End: 2021-05-04

## 2021-05-04 DIAGNOSIS — D68.59 PRIMARY HYPERCOAGULABLE STATE (H): ICD-10-CM

## 2021-05-12 ENCOUNTER — AMBULATORY - HEALTHEAST (OUTPATIENT)
Dept: LAB | Facility: CLINIC | Age: 41
End: 2021-05-12

## 2021-05-12 ENCOUNTER — COMMUNICATION - HEALTHEAST (OUTPATIENT)
Dept: ANTICOAGULATION | Facility: CLINIC | Age: 41
End: 2021-05-12

## 2021-05-12 ENCOUNTER — RECORDS - HEALTHEAST (OUTPATIENT)
Dept: ANTICOAGULATION | Facility: CLINIC | Age: 41
End: 2021-05-12

## 2021-05-12 DIAGNOSIS — D68.59 PRIMARY HYPERCOAGULABLE STATE (H): ICD-10-CM

## 2021-05-12 LAB — INR PPP: 3.2 (ref 0.9–1.1)

## 2021-05-27 ASSESSMENT — PATIENT HEALTH QUESTIONNAIRE - PHQ9: SUM OF ALL RESPONSES TO PHQ QUESTIONS 1-9: 0

## 2021-05-28 NOTE — TELEPHONE ENCOUNTER
ANTICOAGULATION  MANAGEMENT    Assessment     Today's INR result of 2.8   is Therapeutic (goal INR of 2.0-3.0)        Warfarin taken as previously instructed    No new diet changes affecting INR    No new medication/supplements affecting INR    Continues to tolerate warfarin with no reported s/s of bleeding or thromboembolism     Previous INR was Therapeutic    Plan:     Spoke with Jeimy regarding INR result and instructed:     Warfarin Dosing Instructions:  Continue current warfarin dose 15 mg daily on mon; and 10 mg daily rest of week  (0 % change)    Instructed patient to follow up no later than: 4-6 weeks      Instructed to call the [facility] for any changes, questions or concerns. (#[phone])   ?   Karen Rivera RN    Subjective/Objective:      Jeimy Lynch, a 38 y.o. female is on warfarin.     Jeimy reports:     Home warfarin dose: as updated on anticoagulation calendar per template     Missed doses: No     Medication changes:  No     S/S of bleeding or thromboembolism:  No     New Injury or illness:  No     Changes in diet or alcohol consumption:  No     Upcoming surgery, procedure or cardioversion:  No    Anticoagulation Episode Summary     Current INR goal:   2.0-3.0   TTR:   70.7 % (4.3 y)   Next INR check:   5/29/2019   INR from last check:   2.80 (4/24/2019)   Weekly max warfarin dose:      Target end date:      INR check location:      Preferred lab:      Send INR reminders to:   ANTICOAGULATION POOL A (WBY,WBE,MID,RSC)    Indications    Antiphospholipid antibody syndrome (H) [D68.61]  Factor V Leiden Mutation [D68.59]           Comments:            Anticoagulation Care Providers     Provider Role Specialty Phone number    Ayush Juan,   Internal Medicine [phone]

## 2021-05-29 NOTE — TELEPHONE ENCOUNTER
ANTICOAGULATION  MANAGEMENT PROGRAM    Jeimy Lynch is overdue for INR check.  Reminder call made.    Left message for Jeimy. If returning call, please schedule INR check as soon as possible.    Ira uLgo RN

## 2021-05-29 NOTE — TELEPHONE ENCOUNTER
Provider Review: Anticoagulation Annual Referral Renewal    ACM Renewal Decision:  Renew ACM warfarin management      INR Range:   Continue management at current INR goal   Anticipated Duration of Therapy (from today):  Long-term anticoagulation      Ayush Juan DO  6:47 AM

## 2021-05-29 NOTE — TELEPHONE ENCOUNTER
"Anticoagulation Annual Referral Renewal Review    Jeimy Lynch's chart reviewed for annual renewal of referral to anticoagulation monitoring.        Criteria for anticoagulation nurse and/or pharmacist renewal met   Warfarin indication: Hypercoagulable state: antiphospholipid Yes, per indication   Current with INR monitoring/compliant Yes Yes   Date of last office visit 11/21/18 Yes, had office visit within last year   Time in Therapeutic Range (TTR) 58.3 % No, TTR < 60 %       Jeimy Lynch did NOT meet all criteria for anticoagulation management program initiated renewal and requires provider review. Using dot phrase, \".acmrenewalprovider\", please advise if Jeimy's anticoagulation management referral should be renewed or if patient should be seen in office to review anticoagulation therapy      Karen Rivera RN  5:54 PM      "

## 2021-05-30 NOTE — TELEPHONE ENCOUNTER
ANTICOAGULATION  MANAGEMENT    Assessment     Today's INR result of 2.2 is Therapeutic (goal INR of 2.0-3.0)        Warfarin taken as previously instructed    No new diet changes affecting INR    No new medication/supplements affecting INR    Continues to tolerate warfarin with no reported s/s of bleeding or thromboembolism     Previous INR was Supratherapeutic    Plan:     Spoke with Jeimy regarding INR result and instructed:     Warfarin Dosing Instructions:  Continue current warfarin dose 10 mg daily    Instructed patient to follow up no later than: 2 weeks      Jeimy verbalizes understanding and agrees to warfarin dosing plan.    Instructed to call the [facility] for any changes, questions or concerns. (#[phone])   ?   Karen Rivera RN    Subjective/Objective:      Jeimy Lynch, a 39 y.o. female is on warfarin.     Jeimy reports:     Home warfarin dose: as updated on anticoagulation calendar per template     Missed doses: No     Medication changes:  No     S/S of bleeding or thromboembolism:  No     New Injury or illness:  No     Changes in diet or alcohol consumption:  No     Upcoming surgery, procedure or cardioversion:  No    Anticoagulation Episode Summary     Current INR goal:   2.0-3.0   TTR:   68.5 % (4.5 y)   Next INR check:   7/24/2019   INR from last check:   2.20 (7/10/2019)   Weekly max warfarin dose:      Target end date:      INR check location:      Preferred lab:      Send INR reminders to:   [facility]    Indications    Antiphospholipid antibody syndrome (H) [D68.61]  Factor V Leiden Mutation [D68.59]           Comments:            Anticoagulation Care Providers     Provider Role Specialty Phone number    Ayush Juan DO  Internal Medicine [phone]

## 2021-05-30 NOTE — TELEPHONE ENCOUNTER
ANTICOAGULATION  MANAGEMENT    Assessment     Today's INR result of 3.9 is Supratherapeutic (goal INR of 2.0-3.0)        Warfarin taken as previously instructed    Change in alcohol intake may be affecting INR does plan to drink a bit more over summer, will try to add a salad weekly when drinking more    No new medication/supplements affecting INR    Continues to tolerate warfarin with no reported s/s of bleeding or thromboembolism     Previous INR was Therapeutic    Plan:     Spoke with Jeimy regarding INR result and instructed:     Warfarin Dosing Instructions:  hold today then change warfarin dose to 10 mg  (6.7 % change)    Instructed patient to follow up no later than: two weeks      Jeimy verbalizes understanding and agrees to warfarin dosing plan.    Instructed to call the [facility] for any changes, questions or concerns. (#[phone])   ?   Karen Rivera RN    Subjective/Objective:      Jeimy Lynch, a 39 y.o. female is on warfarin.     Jeimy reports:     Home warfarin dose: as updated on anticoagulation calendar per template     Missed doses: No     Medication changes:  No     S/S of bleeding or thromboembolism:  No     New Injury or illness:  No     Changes in diet or alcohol consumption:  No     Upcoming surgery, procedure or cardioversion:  No    Anticoagulation Episode Summary     Current INR goal:   2.0-3.0   TTR:   68.6 % (4.4 y)   Next INR check:   7/10/2019   INR from last check:   3.90! (6/26/2019)   Weekly max warfarin dose:      Target end date:      INR check location:      Preferred lab:      Send INR reminders to:   [facility]    Indications    Antiphospholipid antibody syndrome (H) [D68.61]  Factor V Leiden Mutation [D68.59]           Comments:            Anticoagulation Care Providers     Provider Role Specialty Phone number    Ayush Juan DO  Internal Medicine [phone]

## 2021-05-31 VITALS — HEIGHT: 66 IN | WEIGHT: 133.8 LBS | BODY MASS INDEX: 21.5 KG/M2

## 2021-05-31 NOTE — TELEPHONE ENCOUNTER
ANTICOAGULATION  MANAGEMENT    Assessment     Today's INR result of 3.8 is Supratherapeutic (goal INR of 2.0-3.0)        Warfarin taken as previously instructed    Change in alcohol intake may be affecting INR had more than usual last night    Continues to tolerate warfarin with no reported s/s of bleeding or thromboembolism     Previous INR was Therapeutic    Plan:     Spoke with Jeimy regarding INR result and instructed:     Warfarin Dosing Instructions:  skip today then continue current warfarin dose 10 mg daily     Instructed patient to follow up no later than: two weeks      Jeimy verbalizes understanding and agrees to warfarin dosing plan.    Instructed to call the [facility] for any changes, questions or concerns. (#[phone])   ?   Karen Rivera RN    Subjective/Objective:      Jeimy Lynch, a 39 y.o. female is on warfarin.     Jeimy reports:     Home warfarin dose: as updated on anticoagulation calendar per template     Missed doses: No     Medication changes:  No     S/S of bleeding or thromboembolism:  No     New Injury or illness:  No     Changes in diet or alcohol consumption:  No     Upcoming surgery, procedure or cardioversion:  No    Anticoagulation Episode Summary     Current INR goal:   2.0-3.0   TTR:   68.1 % (4.6 y)   Next INR check:   8/28/2019   INR from last check:   3.80! (8/14/2019)   Weekly max warfarin dose:      Target end date:      INR check location:      Preferred lab:      Send INR reminders to:   [facility]    Indications    Antiphospholipid antibody syndrome (H) [D68.61]  Factor V Leiden Mutation [D68.59]           Comments:            Anticoagulation Care Providers     Provider Role Specialty Phone number    Ayush Juan DO  Internal Medicine [phone]

## 2021-05-31 NOTE — TELEPHONE ENCOUNTER
ANTICOAGULATION  MANAGEMENT PROGRAM    Jeimy Lynch is overdue for INR check.  Reminder call made.    Left message for Jeimy. If returning call, please schedule INR check as soon as possible.    Karen Rivera RN

## 2021-06-01 NOTE — TELEPHONE ENCOUNTER
ANTICOAGULATION  MANAGEMENT    Assessment     Today's INR result of 2.4 is Therapeutic (goal INR of 2.0-3.0)        Warfarin taken as previously instructed    No new diet changes affecting INR    No new medication/supplements affecting INR    Continues to tolerate warfarin with no reported s/s of bleeding or thromboembolism     Previous INR was Supratherapeutic    Plan:     Spoke with Jeimy regarding INR result and instructed:     Warfarin Dosing Instructions:  Continue current warfarin dose 10 mg daily    Instructed patient to follow up no later than: one month      James verbalizes understanding and agrees to warfarin dosing plan.    Instructed to call the [facility] for any changes, questions or concerns. (#[phone])   ?   Karen Rivera RN    Subjective/Objective:      Jeimy Lynch, a 39 y.o. female is on warfarin.     Jeimy reports:     Home warfarin dose: as updated on anticoagulation calendar per template     Missed doses: No     Medication changes:  No     S/S of bleeding or thromboembolism:  No     New Injury or illness:  No     Changes in diet or alcohol consumption:  No     Upcoming surgery, procedure or cardioversion:  No    Anticoagulation Episode Summary     Current INR goal:   2.0-3.0   TTR:   65.2 %   Next INR check:   10/9/2019   INR from last check:   2.40 (9/11/2019)   Weekly max warfarin dose:      Target end date:      INR check location:      Preferred lab:      Send INR reminders to:   [facility]    Indications    Antiphospholipid antibody syndrome (H) [D68.61]  Factor V Leiden Mutation [D68.59]           Comments:            Anticoagulation Care Providers     Provider Role Specialty Phone number    Ayush Juan DO  Internal Medicine [phone]

## 2021-06-02 VITALS — HEIGHT: 66 IN | WEIGHT: 130.19 LBS | BODY MASS INDEX: 20.92 KG/M2

## 2021-06-02 NOTE — TELEPHONE ENCOUNTER
ANTICOAGULATION  MANAGEMENT    Assessment     Today's INR result of 2.7 is Therapeutic (goal INR of 2.0-3.0)        Warfarin taken as previously instructed    No new diet changes affecting INR    No new medication/supplements affecting INR    Continues to tolerate warfarin with no reported s/s of bleeding or thromboembolism     Previous INR was Therapeutic    Plan:     Left a detailed message for Jeimy regarding INR result and instructed:     Warfarin Dosing Instructions:  Continue current warfarin dose 10 mg daily  (0 % change)    Instructed patient to follow up no later than: 5 weeks      Instructed to call the [facility] for any changes, questions or concerns. (#[phone])   ?   Karen Rivera RN    Subjective/Objective:      Jeimy Lynch, a 39 y.o. female is on warfarin.     Jeimy reports:     Home warfarin dose: as updated on anticoagulation calendar per template     Missed doses: No     Medication changes:  No     S/S of bleeding or thromboembolism:  No     New Injury or illness:  No     Changes in diet or alcohol consumption:  No     Upcoming surgery, procedure or cardioversion:  No    Anticoagulation Episode Summary     Current INR goal:   2.0-3.0   TTR:   65.2 %   Next INR check:   12/6/2019   INR from last check:   2.70 (11/1/2019)   Weekly max warfarin dose:      Target end date:      INR check location:      Preferred lab:      Send INR reminders to:   [facility]    Indications    Antiphospholipid antibody syndrome (H) [D68.61]  Factor V Leiden Mutation [D68.59]           Comments:            Anticoagulation Care Providers     Provider Role Specialty Phone number    Ayush Juan DO  Internal Medicine [phone]

## 2021-06-03 ENCOUNTER — COMMUNICATION - HEALTHEAST (OUTPATIENT)
Dept: ANTICOAGULATION | Facility: CLINIC | Age: 41
End: 2021-06-03

## 2021-06-04 NOTE — TELEPHONE ENCOUNTER
ANTICOAGULATION  MANAGEMENT PROGRAM    Jeimy Lynch is overdue for INR check.     Left message to call and schedule INR appointment as soon as possible.      Karen Rivera RN

## 2021-06-04 NOTE — TELEPHONE ENCOUNTER
ANTICOAGULATION  MANAGEMENT    Assessment     Today's INR result of 2.5 is Therapeutic (goal INR of 2.0-3.0)        Warfarin taken as previously instructed    No new diet changes affecting INR    No new medication/supplements affecting INR    Continues to tolerate warfarin with no reported s/s of bleeding or thromboembolism     Previous INR was Therapeutic    Plan:     Spoke with Jeimy regarding INR result and instructed:     Warfarin Dosing Instructions:  Continue current warfarin dose 10 mg daily     Instructed patient to follow up no later than: 6 weeks      Jeimy verbalizes understanding and agrees to warfarin dosing plan.    Instructed to call the [facility] for any changes, questions or concerns. (#[phone])   ?   Karen Rivera RN    Subjective/Objective:      Jeimy Lynhc, a 39 y.o. female is on warfarin.     Jeimy reports:     Home warfarin dose: as updated on anticoagulation calendar per template     Missed doses: No     Medication changes:  No     S/S of bleeding or thromboembolism:  No     New Injury or illness:  No     Changes in diet or alcohol consumption:  No     Upcoming surgery, procedure or cardioversion:  No    Anticoagulation Episode Summary     Current INR goal:   2.0-3.0   TTR:   62.1 % (1 y)   Next INR check:   2/3/2020   INR from last check:   2.50 (12/23/2019)   Weekly max warfarin dose:      Target end date:      INR check location:      Preferred lab:      Send INR reminders to:   [facility]    Indications    Antiphospholipid antibody syndrome (H) [D68.61]  Factor V Leiden Mutation [D68.59]           Comments:            Anticoagulation Care Providers     Provider Role Specialty Phone number    Ayush Juan DO  Internal Medicine [phone]

## 2021-06-05 VITALS
DIASTOLIC BLOOD PRESSURE: 74 MMHG | OXYGEN SATURATION: 97 % | HEIGHT: 66 IN | SYSTOLIC BLOOD PRESSURE: 110 MMHG | BODY MASS INDEX: 22.89 KG/M2 | RESPIRATION RATE: 18 BRPM | WEIGHT: 142.44 LBS | HEART RATE: 102 BPM

## 2021-06-06 NOTE — TELEPHONE ENCOUNTER
"ANTICOAGULATION  MANAGEMENT    Assessment     Today's INR result of 4.9 is Supratherapeutic (goal INR of 2.0-3.0)        Warfarin taken as previously instructed    Decreased greens/vitamin K intake may be affecting INR slight increase in etoh, \"probably has a beer every night\".    No new medication/supplements affecting INR    Continues to tolerate warfarin with no reported s/s of bleeding or thromboembolism     Previous INR was Therapeutic    Plan:     Spoke with Jeimy regarding INR result and instructed:     Warfarin Dosing Instructions:  hold today then change warfarin dose to 5 mg daily on sun/thu; and 10 mg daily rest of week  (14 % change)    Instructed patient to follow up no later than: 7-10 days    Jeimy verbalizes understanding and agrees to warfarin dosing plan.    Instructed to call the [facility] for any changes, questions or concerns. (#[phone])   ?   Karen Rivera RN    Subjective/Objective:      Jeimy Lynch, a 39 y.o. female is on warfarin.     Jeimy reports:     Home warfarin dose: as updated on anticoagulation calendar per template     Missed doses: No     Medication changes:  No     S/S of bleeding or thromboembolism:  No     New Injury or illness:  No     Changes in diet or alcohol consumption:  As above     Upcoming surgery, procedure or cardioversion:  No    Anticoagulation Episode Summary     Current INR goal:   2.0-3.0   TTR:   58.5 % (1 y)   Next INR check:   2/28/2020   INR from last check:   4.90! (2/19/2020)   Weekly max warfarin dose:      Target end date:      INR check location:      Preferred lab:      Send INR reminders to:   [facility]    Indications    Antiphospholipid antibody syndrome (H) [D68.61]  Factor V Leiden Mutation [D68.59]           Comments:            Anticoagulation Care Providers     Provider Role Specialty Phone number    Ayush Juan,   Internal Medicine [phone]        "

## 2021-06-07 NOTE — TELEPHONE ENCOUNTER
Provider Review: Anticoagulation Annual Referral Renewal    ACM Renewal Decision:  Renew ACM warfarin management      INR Range:   Continue management at current INR goal   Anticipated Duration of Therapy (from today):  Long-term anticoagulation      Ayush Juan DO  10:26 AM

## 2021-06-08 NOTE — PROGRESS NOTES
Patient would like the video invitation sent by: Send to e-mail at: [email or web address]      ASSESSMENT:  1. Factor V Leiden Mutation  Refill, no issues, consistent INRs overall at 10 mg warfarin nightly  - warfarin ANTICOAGULANT (COUMADIN/JANTOVEN) 5 MG tablet; TAKE 2 TO 3 TABLETS BY MOUTH DAILY AS DIRECTED  Dispense: 240 tablet; Refill: 3    2. Antiphospholipid antibody syndrome (H)  See above    3. Anxiety, generalized  4. Recurrent major depressive disorder, remission status unspecified (H)  Well maintained, feeling better than even before.  Enjoys working from home    5. Screening cholesterol level  Update   - Glucose; Future    6. Screening for diabetes mellitus  Update, past level high  - Lipid Cooper FASTING; Future        Preventive Health Care:  Gets pap through [facility]    Lab Results   Component Value Date    INR 2.70 (H) 04/08/2020    INR 2.20 (H) 03/04/2020    INR 4.90 (H) 02/19/2020      Takes 10 mg dailymild stress    [facility] 2023    PLAN:  There are no Patient Instructions on file for this visit.    Orders Placed This Encounter   Procedures     Glucose     Standing Status:   Future     Standing Expiration Date:   5/27/2021     Lipid Cooper FASTING     Standing Status:   Future     Standing Expiration Date:   5/27/2021     Order Specific Question:   Fasting is required?     Answer:   Yes     Return in about 1 year (around 5/27/2021) for Annual physical.    CHIEF COMPLAINT:  Chief Complaint   Patient presents with     Follow-up     Warfarin       HISTORY OF PRESENT ILLNESS:  Jeimy is a 39 y.o. female contacting the clinic today via video for routine clinic follow-up.    She has history of hypercoagulable state and has been maintained on warfarin.  She does not have any issues with this.    She works at an online [employer], has been working from home, otherwise her job has been unchanged.  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    Physically, she  "feels well.  She weighs 127 pounds, a weight she is comfortable with.  She has history of eating disorder, but that is long in the past.    Her mental health is good, she does not have concerns here.  She sees another provider for this.    She is slightly overdue for follow-up cholesterol, which was a bit high when we last checked in 2017.    She has her Pap smear done at [facility].  This was done April 2018, was normal along with HPV testing, so she will be due in April 2023 next.    REVIEW OF SYSTEMS:     All other systems are negative.    [facility]:  Works at [employer] an [employer]    TOBACCO USE:  Social History     Tobacco Use   Smoking Status Current Every Day Smoker     Types: Cigarettes     Start date: 2002   Smokeless Tobacco Current User       VITALS:    Stated Weight 127 lbs    PHYSICAL EXAM:  (observations via Video)    GEN; alert, no distress  Psych: pleasant affect, normal mood      ADDITIONAL HISTORY SUMMARIZED (2): reviewed last note from 2017, using wellbutrin to help quit smoking  CARE EVERYWHERE/ EXTRA INFORMATION (1):   RADIOLOGY TESTS (1):   LABS (1): labs ordered  CARDIOLOGY/MEDICINE TESTS (1):   INDEPENDENT REVIEW (2 each):     Total data points: 3    Video Start time:  0937  Video End time:  0954    The visit lasted a total of 15 minutes face to face    CA intake time  3 minutes      The patient has been notified of following:     \"This video visit will be conducted via a call between you and your physician/provider. We have found that certain health care needs can be provided without the need for an in-person physical exam.  This service lets us provide the care you need with a video conversation.  If a prescription is necessary we can send it directly to your pharmacy.  If lab work is needed we can place an order for that and you can then stop by our lab to have the test done at a later time.    Video visits are billed at different rates depending on your insurance " "coverage. Please reach out to your insurance provider with any questions.    If during the course of the call the physician/provider feels a video visit is not appropriate, you will not be charged for this service.\"    Patient has given verbal consent to a Video visit? Yes    Patient would like to receive their AVS by AVS Preference: MyChart.        Video-Visit Details    Type of service:  Video Visit    Originating Location (pt. Location): Home    Distant Location (provider location):  [facility] INTERNAL MEDICINE     Mode of Communication:  Video Conference via [facility]    Ayush Juan,      "

## 2021-06-08 NOTE — TELEPHONE ENCOUNTER
Patient has lab appt on 6/3/2020 for INR. Current order expires on 5/29/2020. Please put new order in prior to lab appt.    Thanks

## 2021-06-08 NOTE — TELEPHONE ENCOUNTER
ANTICOAGULATION  MANAGEMENT    Assessment     Today's INR result of 1.9 is Subtherapeutic (goal INR of 2.0-3.0)        Missed dose(s) may be affecting INR- missed on 5/31.    No new diet changes affecting INR    No new medication/supplements affecting INR    Continues to tolerate warfarin with no reported s/s of bleeding or thromboembolism     Previous INR was Therapeutic    Plan:     Spoke with Jeimy regarding INR result and instructed:     Warfarin Dosing Instructions:  Take booster dose of 15 mg today only then continue current warfarin dose    5 mg every Sun, Thu; 10 mg all other days         Instructed patient to follow up no later than: 2 weeks.     Education provided: importance of taking warfarin as instructed    Jeimy verbalizes understanding and agrees to warfarin dosing plan.    Instructed to call the [facility] for any changes, questions or concerns. (#[phone])   ?   Navarro Funes RN    Subjective/Objective:      Jeimy Lynch, a 40 y.o. female is on warfarin.     Jeimy reports:     Home warfarin dose: verbally confirmed home dose with pt and updated on anticoagulation calendar     Missed doses: Yes, missed on 5/31.      Medication changes:  No     S/S of bleeding or thromboembolism:  No     New Injury or illness:  No     Changes in diet or alcohol consumption:  No     Upcoming surgery, procedure or cardioversion:  No    Anticoagulation Episode Summary     Current INR goal:   2.0-3.0   TTR:   65.5 % (1 y)   Next INR check:   6/17/2020   INR from last check:   1.90! (6/3/2020)   Weekly max warfarin dose:      Target end date:      INR check location:      Preferred lab:      Send INR reminders to:   [facility]    Indications    Antiphospholipid antibody syndrome (H) [D68.61]  Factor V Leiden Mutation [D68.59]           Comments:            Anticoagulation Care Providers     Provider Role Specialty Phone number    Ayush Juan,   Internal Medicine [phone]

## 2021-06-09 NOTE — TELEPHONE ENCOUNTER
ANTICOAGULATION  MANAGEMENT    Assessment     Today's INR result of 3.5 is Supratherapeutic (goal INR of 2.0-3.0)        Warfarin taken as previously instructed    No new diet changes affecting INR    No new medication/supplements affecting INR    Continues to tolerate warfarin with no reported s/s of bleeding or thromboembolism     Previous INR was Subtherapeutic    Plan:     Left a detailed message for Jeimy regarding INR result and instructed:     Warfarin Dosing Instructions:  skip today then continue current warfarin dose 5 mg daily on sun/thu; and 10 mg daily rest of week  (0 % change)    Instructed patient to follow up no later than: two weeks        Instructed to call the [facility] for any changes, questions or concerns. (#[phone])   ?   Karen Rivera RN    Subjective/Objective:      Jeimy Lynch, a 40 y.o. female is on warfarin.     Jeimy reports:     Home warfarin dose: as updated on anticoagulation calendar per template     Missed doses: No     Medication changes:  No     S/S of bleeding or thromboembolism:  No     New Injury or illness:  No     Changes in diet or alcohol consumption:  No     Upcoming surgery, procedure or cardioversion:  No    Anticoagulation Episode Summary     Current INR goal:   2.0-3.0   TTR:   70.1 % (1 y)   Next INR check:   7/22/2020   INR from last check:   3.50! (7/8/2020)   Weekly max warfarin dose:      Target end date:      INR check location:      Preferred lab:      Send INR reminders to:   [facility]    Indications    Antiphospholipid antibody syndrome (H) [D68.61]  Factor V Leiden Mutation [D68.59]           Comments:            Anticoagulation Care Providers     Provider Role Specialty Phone number    Ayush Juan DO  Internal Medicine [phone]

## 2021-06-10 NOTE — TELEPHONE ENCOUNTER
ANTICOAGULATION  MANAGEMENT    Assessment     Today's INR result of 3.0  is Therapeutic (goal INR of 2.0-3.0)        Left message asking Jeimy to call with any changes in her warfarin dose dose, diet or other medicines , no template today    No new medication/supplements affecting INR    Continues to tolerate warfarin with no reported s/s of bleeding or thromboembolism     Previous INR was Supratherapeutic    Plan:     Left detailed message for Jeimy regarding INR result and instructed:     Warfarin Dosing Instructions:  Continue current warfarin dose 5 mg daily on sun/thu; and 10 mg daily rest of week  (0 % change)    Instructed patient to follow up no later than: one month.    Instructed to call the [facility] for any changes, questions or concerns. (#[phone])   ?   Karen Rivera RN    Subjective/Objective:      Jeimy Lynch, a 40 y.o. female is on warfarin.     Jeimy reports:       Anticoagulation Episode Summary     Current INR goal:   2.0-3.0   TTR:   64.9 % (1 y)   Next INR check:   9/2/2020   INR from last check:   3.00 (8/5/2020)   Weekly max warfarin dose:      Target end date:      INR check location:      Preferred lab:      Send INR reminders to:   [facility]    Indications    Antiphospholipid antibody syndrome (H) [D68.61]  Factor V Leiden Mutation [D68.59]           Comments:            Anticoagulation Care Providers     Provider Role Specialty Phone number    Ayush Juan DO  Internal Medicine [phone]

## 2021-06-11 NOTE — TELEPHONE ENCOUNTER
ANTICOAGULATION  MANAGEMENT    Assessment     Today's INR result of 2.1 is Therapeutic (goal INR of 2.0-3.0)        Warfarin taken as previously instructed    No new diet changes affecting INR    No new medication/supplements affecting INR    Continues to tolerate warfarin with no reported s/s of bleeding or thromboembolism     Previous INR was Therapeutic    Plan:     Spoke on phone with Jeimy regarding INR result and instructed:     Warfarin Dosing Instructions:  Continue current warfarin dose 5 mg daily on sun/thu; and 10 mg daily rest of week  (0 % change)    Instructed patient to follow up no later than: 4-6 weeks      Jeimy verbalizes understanding and agrees to warfarin dosing plan.    Instructed to call the [facility] for any changes, questions or concerns. (#[phone])   ?   Karen Rivera RN    Subjective/Objective:      Jeimy Lynch, a 40 y.o. female is on warfarin.     Jeimy reports:     Home warfarin dose: as updated on anticoagulation calendar per template     Missed doses: No     Medication changes:  No     S/S of bleeding or thromboembolism:  No     New Injury or illness:  No     Changes in diet or alcohol consumption:  No     Upcoming surgery, procedure or cardioversion:  No    Anticoagulation Episode Summary     Current INR goal:   2.0-3.0   TTR:   71.6 % (1 y)   Next INR check:   11/4/2020   INR from last check:   2.10 (9/23/2020)   Weekly max warfarin dose:      Target end date:      INR check location:      Preferred lab:      Send INR reminders to:   [facility]    Indications    Antiphospholipid antibody syndrome (H) [D68.61]  Factor V Leiden Mutation [D68.59]           Comments:            Anticoagulation Care Providers     Provider Role Specialty Phone number    Ayush Juan DO  Internal Medicine [phone]

## 2021-06-12 NOTE — PROGRESS NOTES
[facility] Clinic Note    Jeimy Lynch   37 y.o. female    Date of Visit: 8/30/2017  Chief Complaint   Patient presents with     Medication recheck       ASSESSMENT/PLAN  1. Tobacco dependence     2. Factor V Leiden Mutation  INR    warfarin (COUMADIN) 5 MG tablet   3. Antiphospholipid Antibody Syndrome  INR    warfarin (COUMADIN) 5 MG tablet     ---------------------------------------------    1.  Counseled for 5 minutes on tobacco cessation, will prescribe nicotine lozenges, which worked for    2-3.  Refill warfarin, currently well treated under anticoagulation clinic    Return in about 1 year (around 8/30/2018) for Recheck.      SUBJECTIVE  Jeimy Lynch is a 37-year-old woman with history of factor V Leiden who presents for follow-up.  She has not had any bleeding complications since being on warfarin.    She is about to plan to quit smoking, she was able to do this before for 2 years by using the [employer] brand cherry flavored nicotine lozenges 4 mg dose.  She would like this sent to her pharmacy so that when she does decide to quit this will be ready for her.  Currently, she is smoking about 10 cigarettes per day.  She is on Wellbutrin already, and has noticed some curbing of her cravings since being on this.    She has another provider for mental health who prescribes her medication, also has another provider who will help her update her cervical cancer screening this year.  She is asked to let them know that she goes to this clinic so that they can send the record to us.    She currently works at [employer].      ROS A comprehensive review of systems was performed and was otherwise negative    Medications, allergies, and problem list were reviewed and updated    Patient Active Problem List   Diagnosis     Antiphospholipid Antibody Syndrome     Factor V Leiden Mutation     Tobacco dependence     Anxiety, generalized     Depression, major, recurrent     Past Medical History:   Diagnosis Date  "    Anxiety      Borderline personality disorder      Depression      DVT (deep venous thrombosis), left      No past surgical history on file.  Social History     Social History     Marital status:      Spouse name: N/A     Number of children: N/A     Years of education: N/A     Occupational History     Not on file.     Social History Main Topics     Smoking status: Current Every Day Smoker     Types: Cigarettes     Start date: 2002     Smokeless tobacco: Current User     Alcohol use Yes      Comment: 1/2 pack per day     Drug use: Not on file     Sexual activity: No     Other Topics Concern     Not on file     Social History Narrative     Family History   Problem Relation Age of Onset     Factor V Leiden deficiency Mother        Current Outpatient Prescriptions   Medication Sig Dispense Refill     buPROPion (WELLBUTRIN XL) 300 MG 24 hr tablet TK 1 T PO QD  2     lamoTRIgine (LAMICTAL) 25 MG tablet TK 8 TS PO D UTD  2     LORazepam (ATIVAN) 2 MG tablet TK 2 TS PO BID PRN  2     risperiDONE (RISPERDAL) 4 MG tablet TK 1 T PO HS  2     warfarin (COUMADIN) 5 MG tablet Take 2-3 tablets (10 mg -15mg ) daily as directed based on inr result 240 tablet 1     nicotine polacrilex (COMMIT) 4 MG lozenge Apply 1 lozenge (4 mg total) to the mouth or throat as needed for smoking cessation. 144 each 3     No current facility-administered medications for this visit.        No Known Allergies    EXAM  Vitals:    08/30/17 0919   BP: 104/58   Pulse: 100   SpO2: 98%   Weight: 133 lb 12.8 oz (60.7 kg)   Height: 5' 6\" (1.676 m)     General: Alert, no distress  Psychiatric: Pleasant affect  Skin: No ecchymoses    RESULTS REVIEWED:     ANALYSIS AND SUMMARY OF OLD RECORDS, NOTES AND CONSULTS (2): Reviewed my last note from 2016    Data points  2     Ayush Juan DO  Internal Medicine  [facility]   "

## 2021-06-14 NOTE — TELEPHONE ENCOUNTER
ANTICOAGULATION  MANAGEMENT    Assessment     Today's INR result of 2.9 is Therapeutic (goal INR of 2.0-3.0)        Warfarin taken as previously instructed    No new diet changes affecting INR    No new medication/supplements affecting INR    Continues to tolerate warfarin with no reported s/s of bleeding or thromboembolism     Previous INR was Supratherapeutic    Plan:     Spoke on phone with Jeimy regarding INR result and instructed:     Warfarin Dosing Instructions:  Continue current warfarin dose 5 mg daily on sun/thu; and 10 mg daily rest of week  (0 % change)    Instructed patient to follow up no later than: one month      Jeimy verbalizes understanding and agrees to warfarin dosing plan.    Instructed to call the [facility] for any changes, questions or concerns. (#[phone])   ?   Karen Rivera RN    Subjective/Objective:      Jeimy Lynch, a 40 y.o. female is on warfarin.     Jeimy reports:     Home warfarin dose: as updated on anticoagulation calendar per template     Missed doses: No     Medication changes:  No     S/S of bleeding or thromboembolism:  No     New Injury or illness:  No     Changes in diet or alcohol consumption:  No     Upcoming surgery, procedure or cardioversion:  No    Anticoagulation Episode Summary     Current INR goal:  2.0-3.0   TTR:  58.7 % (1 y)   Next INR check:  2/3/2021   INR from last check:  2.90 (1/6/2021)   Weekly max warfarin dose:     Target end date:     INR check location:     Preferred lab:     Send INR reminders to:  [facility]    Indications    Antiphospholipid antibody syndrome (H) [D68.61]  Factor V Leiden Mutation [D68.59]           Comments:           Anticoagulation Care Providers     Provider Role Specialty Phone number    Ayush Juan,   Internal Medicine [phone]

## 2021-06-14 NOTE — PROGRESS NOTES
" ASSESSMENT AND PLAN:    1. Gastroenteritis  Symptoms and exam suggest recent enteritis, now improving. BM's are more scybllous than diarrhea.  Can't exclude something like giardia but no clear risk factor - travel, well or lake water, etc. Lactose intolerance is also not suggested.   Will follow without treatment.  She will follow up if fails to improve or symptoms progress. There has been no weight loss.     Patient Instructions   1. Reassured.  Follow symptoms. Further evaluation if fails to improve.     CHIEF COMPLAINT:  Chief Complaint   Patient presents with     GI Problem     nausea/losss of appetite - diarrhea x 3weeks     HISTORY OF PRESENT ILLNESS:  Jeimy Lynch is a 40 y.o. female with GI symptoms the past few weeks.  Mostly AM BM's that are 'small santiago' or lose.  Not during the night or later in the day.  No pain, or bleeding, or mucous.  No voiding symptoms or night sweats, or fever.  No nausea or emesis, just reduced appetite.  No travel, or exposure to lake or well water.  Periods are regular and no possibility, per her report, of being pregnant.     REVIEW OF SYSTEMS:   See HPI, all other systems on review are negative.    Past Medical History:   Diagnosis Date     Anxiety      Borderline personality disorder (H)      Depression      DVT (deep venous thrombosis), left      Social History     Tobacco Use   Smoking Status Current Every Day Smoker     Packs/day: 0.10     Types: Cigarettes     Start date: 2002   Smokeless Tobacco Current User     Family History   Problem Relation Age of Onset     Factor V Leiden deficiency Mother      No past surgical history on file.  VITALS:  Vitals:    02/03/21 1139   BP: 110/74   Patient Site: Left Arm   Patient Position: Sitting   Cuff Size: Adult Regular   Pulse: (!) 102   Resp: 18   SpO2: 97%   Weight: 142 lb 7 oz (64.6 kg)   Height: 5' 6\" (1.676 m)     Wt Readings from Last 3 Encounters:   02/03/21 142 lb 7 oz (64.6 kg)   11/21/18 130 lb 3 oz (59.1 kg) "   08/30/17 133 lb 12.8 oz (60.7 kg)     PHYSICAL EXAM:  Constitutional:  In NAD, alert and oriented  Neck: no cervical or axillary adenopathy  Cardiac:  S1 S2   Lungs: Clear to auscultation  Abdomen:   Soft, flat and non-tender, without guarding, rebound, or mass.    Psychiatric:  Mood and behavior appropriate, thinking is clear.     Current Outpatient Medications   Medication Sig Dispense Refill     buPROPion (WELLBUTRIN XL) 300 MG 24 hr tablet TK 1 T PO QD  2     lamoTRIgine (LAMICTAL) 25 MG tablet TK 8 TS PO D UTD  2     LORazepam (ATIVAN) 2 MG tablet TK 2 TS PO BID PRN  2     warfarin ANTICOAGULANT (COUMADIN/JANTOVEN) 5 MG tablet TAKE 2 TO 3 TABLETS BY MOUTH DAILY AS DIRECTED 240 tablet 3     Jose Anand MD  Internal Medicine  [facility]

## 2021-06-16 ENCOUNTER — COMMUNICATION - HEALTHEAST (OUTPATIENT)
Dept: ANTICOAGULATION | Facility: CLINIC | Age: 41
End: 2021-06-16

## 2021-06-16 ENCOUNTER — AMBULATORY - HEALTHEAST (OUTPATIENT)
Dept: LAB | Facility: CLINIC | Age: 41
End: 2021-06-16

## 2021-06-16 DIAGNOSIS — D68.59 PRIMARY HYPERCOAGULABLE STATE (H): ICD-10-CM

## 2021-06-16 PROBLEM — K52.9 GASTROENTERITIS: Status: ACTIVE | Noted: 2021-02-03

## 2021-06-16 LAB — INR PPP: 1.8 (ref 0.9–1.1)

## 2021-06-16 NOTE — TELEPHONE ENCOUNTER
ANTICOAGULATION  MANAGEMENT    Assessment     Today's INR result of 2.6 is Therapeutic (goal INR of 2.0-3.0)        Warfarin taken as previously instructed    No new diet changes affecting INR    No new medication/supplements affecting INR    Continues to tolerate warfarin with no reported s/s of bleeding or thromboembolism     Previous INR was Subtherapeutic    Plan:     Left detailed message for Jeimy regarding INR result and instructed:      Warfarin Dosing Instructions:  Continue current warfarin dose 5 mg daily on sun/thu; and 10 mg daily rest of week  (0 % change)    Instructed patient to follow up no later than: one month      Instructed to call the [facility] for any changes, questions or concerns. (#[phone])   ?   Karen Rivera RN    Subjective/Objective:      Jeimy Lynch, a 40 y.o. female is on warfarin. Jeimy      Jeimy reports:     Home warfarin dose: as updated on anticoagulation calendar per template     Missed doses: No     Medication changes:  No     S/S of bleeding or thromboembolism:  No     New Injury or illness:  No     Changes in diet or alcohol consumption:  No     Upcoming surgery, procedure or cardioversion:  No    Anticoagulation Episode Summary     Current INR goal:  2.0-3.0   TTR:  67.7 % (1 y)   Next INR check:  4/16/2021   INR from last check:  2.60 (3/19/2021)   Weekly max warfarin dose:     Target end date:     INR check location:     Preferred lab:     Send INR reminders to:  [facility]    Indications    Antiphospholipid antibody syndrome (H) (Resolved) [D68.61]  Factor V Leiden Mutation [D68.59]           Comments:           Anticoagulation Care Providers     Provider Role Specialty Phone number    Ayush Juan DO  Internal Medicine [phone]

## 2021-06-17 NOTE — PROGRESS NOTES
Anticoagulation Annual Referral Renewal Review    Jeimy Lynch's chart reviewed for annual renewal of referral to anticoagulation monitoring.        Criteria for anticoagulation nurse and/or pharmacist renewal met   Warfarin indication: Hypercoagulable state: antiphospholipid and factor V leiden Yes, per indication   Current with INR monitoring/compliant Yes Yes   Date of last office visit 2/3/21 Yes, had office visit within last year   Time in Therapeutic Range (TTR) 67.7 % Yes, TTR > 60%       Jeimy Lynch met all criteria for anticoagulation management program initiated renewal.  New INR standing orders and anticoagulation referral renewal placed.      Karen Rivera RN  3:38 PM

## 2021-06-17 NOTE — TELEPHONE ENCOUNTER
Telephone Encounter by Karen Rivera RN at 2/17/2021 10:39 AM     Author: Karen Rivera RN Service: -- Author Type: Registered Nurse    Filed: 2/17/2021 12:46 PM Encounter Date: 2/17/2021 Status: Signed    : Karen Rivera RN (Registered Nurse)       ANTICOAGULATION  MANAGEMENT    Assessment     Today's INR result of 1.7 is Subtherapeutic (goal INR of 2.0-3.0)        Warfarin taken as previously instructed    Increased greens/vitamin K intake may be affecting INR  Back to usual    No new medication/supplements affecting INR    Continues to tolerate warfarin with no reported s/s of bleeding or thromboembolism     Previous INR was Therapeutic    Plan:     Spoke on phone with Jeimy regarding INR result and instructed:      Warfarin Dosing Instructions:  10 mg tomorrow to boost then continue current warfarin dose 5 mg daily on mon/thu; and 10 mg daily rest of week  (0 % change)    Instructed patient to follow up no later than: two weeks      Jeimy verbalizes understanding and agrees to warfarin dosing plan.    Instructed to call the [facility] for any changes, questions or concerns. (#[phone])   ?   Karen Rivera RN    Subjective/Objective:      Jeimy SANDY Lynch, a 40 y.o. female is on warfarin. Jeimy Musnon reports:     Home warfarin dose: as updated on anticoagulation calendar per template     Missed doses: No     Medication changes:  No     S/S of bleeding or thromboembolism:  No     New Injury or illness:  No     Changes in diet or alcohol consumption:  No     Upcoming surgery, procedure or cardioversion:  No    Anticoagulation Episode Summary     Current INR goal:  2.0-3.0   TTR:  67.3 % (1 y)   Next INR check:  3/3/2021   INR from last check:  1.70 (2/17/2021)   Weekly max warfarin dose:     Target end date:     INR check location:     Preferred lab:     Send INR reminders to:  [facility]    Indications    Antiphospholipid antibody syndrome (H) (Resolved) [D68.61]  Factor V Leiden Mutation  [P29.59]           Comments:           Anticoagulation Care Providers     Provider Role Specialty Phone number    Ayush Juan,   Internal Medicine [phone]

## 2021-06-17 NOTE — TELEPHONE ENCOUNTER
Anticoagulation Management    Unable to reach Jeimy today.    Today's INR result of 3.20 is Supratherapeutic (goal INR of 2.0-3.0).   - completed Covid-19 vaccinations.   - Reported, reacted from 2nd dose vaccine with fever, chills, aches, and fatigue.      Follow up required to discuss out of range INR .    Left message to take reduced dose of warfarin, 7.5 mg tonight.       [facility] to follow up - Thurs. 5/13.    Abigail Mcbride RN

## 2021-06-17 NOTE — TELEPHONE ENCOUNTER
Called and left detailed message with Jeimy.     - no call back received from patient.     - just called to ensure, she took one time lower dose on 7.5mg warfarin on 5/12. Then to resume her usual dose, thereafter.     - advised to recheck INR in 1-2 wks.     - to call and schedule next [phone], to ensure INR stability.

## 2021-06-17 NOTE — TELEPHONE ENCOUNTER
Telephone Encounter by Karen Rivera RN at 12/2/2020 11:23 AM     Author: Karen Rivera RN Service: -- Author Type: Registered Nurse    Filed: 12/2/2020 11:30 AM Encounter Date: 12/2/2020 Status: Signed    : Karen Rivera RN (Registered Nurse)       ANTICOAGULATION  MANAGEMENT    Assessment     Today's INR result of 3.4 is Supratherapeutic (goal INR of 2.0-3.0)        Warfarin taken as previously instructed    No new diet changes affecting INR    No new medication/supplements affecting INR    Continues to tolerate warfarin with no reported s/s of bleeding or thromboembolism     Previous INR was Therapeutic    Plan:     Left detailed message for Jeimy regarding INR result and instructed:     Warfarin Dosing Instructions:  skip today then continue current warfarin dose 5 mg daily on sun/thu; and 10 mg daily rest of week  (0 % change)    Instructed patient to follow up no later than: two weeks    Instructed to call the [facility] for any changes, questions or concerns. (#[phone])   ?   Karen Rivera RN    Subjective/Objective:      Jeimy SANDY Lynch, a 40 y.o. female is on warfarin.     Jeimy reports:     Home warfarin dose: as updated on anticoagulation calendar per template     Missed doses: No     Medication changes:  No     S/S of bleeding or thromboembolism:  No     New Injury or illness:  No     Changes in diet or alcohol consumption:  No     Upcoming surgery, procedure or cardioversion:  No    Anticoagulation Episode Summary     Current INR goal:  2.0-3.0   TTR:  65.7 % (1 y)   Next INR check:  12/16/2020   INR from last check:  3.40 (12/2/2020)   Weekly max warfarin dose:     Target end date:     INR check location:     Preferred lab:     Send INR reminders to:  [facility]    Indications    Antiphospholipid antibody syndrome (H) [D68.61]  Factor V Leiden Mutation [D68.59]           Comments:           Anticoagulation Care Providers     Provider Role Specialty Phone number    Ayush Juan  DO aZc  Internal Medicine [phone]

## 2021-06-17 NOTE — TELEPHONE ENCOUNTER
Telephone Encounter by Karen Rivera RN at 4/8/2020 10:54 AM     Author: Karen Rivera RN Service: -- Author Type: Registered Nurse    Filed: 4/8/2020 10:58 AM Encounter Date: 4/8/2020 Status: Attested    : Karen Rivera RN (Registered Nurse) Cosigner: Ayush Juan DO at 4/8/2020 11:58 AM    Attestation signed by Ayush Juan DO at 4/8/2020 11:58 AM                     ANTICOAGULATION  MANAGEMENT    Assessment     Today's INR result of 2.7 is Therapeutic (goal INR of 2.0-3.0)        Warfarin taken as previously instructed    No new diet changes affecting INR    No new medication/supplements affecting INR    Continues to tolerate warfarin with no reported s/s of bleeding or thromboembolism     Previous INR was Therapeutic    Plan:     Left a detailed message for Jeimy regarding INR result and instructed:     Warfarin Dosing Instructions:  Continue current warfarin dose 5 mg daily on sun/thu; and 10 mg daily rest of week  (0 % change)    Instructed patient to follow up no later than: one month      Instructed to call the [facility] for any changes, questions or concerns. (#[phone])   ?   Karen Rivera RN    Subjective/Objective:      Jeimy DELGADO Syed, a 39 y.o. female is on warfarin.     Jeimy reports:     Home warfarin dose: as updated on anticoagulation calendar per template     Missed doses: No     Medication changes:  No     S/S of bleeding or thromboembolism:  No     New Injury or illness:  No     Changes in diet or alcohol consumption:  No     Upcoming surgery, procedure or cardioversion:  No    Anticoagulation Episode Summary     Current INR goal:   2.0-3.0   TTR:   59.4 % (1 y)   Next INR check:   5/6/2020   INR from last check:   2.70 (4/8/2020)   Weekly max warfarin dose:      Target end date:      INR check location:      Preferred lab:      Send INR reminders to:   [facility]    Indications    Antiphospholipid antibody syndrome (H) [D68.61]  Factor V Leiden Mutation  [W08.59]           Comments:            Anticoagulation Care Providers     Provider Role Specialty Phone number    Ayush Juan,   Internal Medicine [phone]

## 2021-06-19 NOTE — LETTER
Letter by Ayush Juan DO at      Author: Ayush Juan DO Service: -- Author Type: --    Filed:  Encounter Date: 11/29/2019 Status: Signed       Jeimy Lynch  [address] [place] [address]    November 29, 2019    Dear Jeimy    In reviewing your records, we have determined a gap in your preventive services. Based on your age and health history, we recommend the follow service.     ? General Physical  ? Physical with a Pap Smear  ? Colon cancer screening  ? Mammogram  ? Immunization  ? Diabetic check  ? Blood pressure/cardiovascular check  ? Asthma check  ? Cholesterol test  ? Lab work  ? Med check    If you have had the service elsewhere, please contact us so we can update our records. Please let us know if you have transferred your care to another clinic.    Please call [phone] to schedule this appointment.    We believe that a strong preventive care program, including regular physicals and follow-up care is an important part of a healthy lifestyle and we are committed to helping you maintain your health.    Thank you for choosing us as your health care provider.    Sincerely,   [facility]  [address] [facility] [address]  Phone Number:  [phone]

## 2021-06-21 NOTE — PROGRESS NOTES
1. Factor V Leiden Mutation  warfarin (COUMADIN/JANTOVEN) 5 MG tablet    INR   2. Antiphospholipid antibody syndrome (H)  INR        Medications Ordered   Medications     warfarin (COUMADIN/JANTOVEN) 5 MG tablet     Sig: TAKE 2 TO 3 TABLETS BY MOUTH DAILY AS DIRECTED.     Dispense:  240 tablet     Refill:  3        Plan: I did refill her warfarin tablets for her.  She uses on average about 10 mg a day but goes anywhere between 10 and 15, so we gave her 240 tablets which generally lasts her 3 months with some refills.  She will follow-up as needed.    Subjective: 38-year-old female is here today for medication refill and an INR check.  She takes warfarin, and has for many years since it was found has factor V Leiden mutation and antiphospholipid antibody syndrome.  She had a DVT at a young age and has been doing well since going on Coumadin.  She requires relatively high doses for her body size.  She has no problems or complications with the medications.    Objective: Well-appearing female no acute distress.  Vital signs as noted.  Chest clear to auscultation.  Heart regular rate and rhythm.  Extremities unremarkable.

## 2021-06-23 NOTE — TELEPHONE ENCOUNTER
ANTICOAGULATION  MANAGEMENT    Assessment     Today's INR result of 1.7 is Subtherapeutic (goal INR of 2.0-3.0)        Warfarin taken as previously instructed    No new diet changes affecting INR    Interaction between multivitamin and warfarin may be affecting INR started three days ago and will continue    Continues to tolerate warfarin with no reported s/s of bleeding or thromboembolism     Previous INR was Therapeutic    Plan:     Spoke with Jeimy regarding INR result and instructed:     Warfarin Dosing Instructions:  Change warfarin dose to 15 mg daily on mon; and 10 mg daily rest of week  (7 % change)    Instructed patient to follow up no later than: 1-2 weeks    Jeimy verbalizes understanding and agrees to warfarin dosing plan.    Instructed to call the [facility] for any changes, questions or concerns. (#[phone])   ?   Karen Rivera RN    Subjective/Objective:      Jeimy Lynch, a 38 y.o. female is on warfarin.     Jeimy reports:     Home warfarin dose: verbally confirmed home dose with Jeimy and updated on anticoagulation calendar     Missed doses: No     Medication changes:  Yes: above     S/S of bleeding or thromboembolism:  No     New Injury or illness:  No     Changes in diet or alcohol consumption:  No     Upcoming surgery, procedure or cardioversion:  No    Anticoagulation Episode Summary     Current INR goal:   2.0-3.0   TTR:   70.3 % (4 y)   Next INR check:   1/28/2019   INR from last check:   1.70! (1/14/2019)   Weekly max warfarin dose:      Target end date:      INR check location:      Preferred lab:      Send INR reminders to:   ANTICOAGULATION POOL A (WBY,WBE,MID,RSC)    Indications    Antiphospholipid antibody syndrome (H) [D68.61]  Factor V Leiden Mutation [D68.59]           Comments:            Anticoagulation Care Providers     Provider Role Specialty Phone number    Ayush Juan DO  Internal Medicine [phone]

## 2021-06-23 NOTE — TELEPHONE ENCOUNTER
ANTICOAGULATION  MANAGEMENT    Assessment     Today's INR result of 2.4 is Therapeutic (goal INR of 2.0-3.0)        Warfarin taken as previously instructed    No new diet changes affecting INR    No new medication/supplements affecting INR    Continues to tolerate warfarin with no reported s/s of bleeding or thromboembolism     Previous INR was Therapeutic    Plan:     Spoke with Jeimy regarding INR result and instructed:     Warfarin Dosing Instructions:  Continue current warfarin dose 15 mg daily on mon; and 10 mg daily rest of week  (0 % change)    Instructed patient to follow up no later than: two weeks      Jeimy verbalizes understanding and agrees to warfarin dosing plan.    Instructed to call the [facility] for any changes, questions or concerns. (#[phone])   ?   Karen Rivera RN    Subjective/Objective:      Jeimy Lynch, a 38 y.o. female is on warfarin.     Jeimy reports:     Home warfarin dose: verbally confirmed home dose with Jeimy and updated on anticoagulation calendar     Missed doses: No     Medication changes:  No     S/S of bleeding or thromboembolism:  No     New Injury or illness:  No     Changes in diet or alcohol consumption:  No     Upcoming surgery, procedure or cardioversion:  No    Anticoagulation Episode Summary     Current INR goal:   2.0-3.0   TTR:   70.1 % (4 y)   Next INR check:   2/20/2019   INR from last check:   2.40 (2/6/2019)   Weekly max warfarin dose:      Target end date:      INR check location:      Preferred lab:      Send INR reminders to:   ANTICOAGULATION POOL A (WBY,WBE,MID,RSC)    Indications    Antiphospholipid antibody syndrome (H) [D68.61]  Factor V Leiden Mutation [D68.59]           Comments:            Anticoagulation Care Providers     Provider Role Specialty Phone number    Ayush Juan DO  Internal Medicine [phone]

## 2021-06-24 NOTE — TELEPHONE ENCOUNTER
ANTICOAGULATION  MANAGEMENT    Assessment     Today's INR result of 1.7 is Subtherapeutic (goal INR of 2.0-3.0)        Warfarin taken as previously instructed thinks she may have missed one dose    No new diet changes affecting INR    No new medication/supplements affecting INR    Continues to tolerate warfarin with no reported s/s of bleeding or thromboembolism     Previous INR was Therapeutic    Plan:     Spoke with Jeimy regarding INR result and instructed:     Warfarin Dosing Instructions:  have 15 mg tonight to boost then continue current warfarin dose 15 mg daily on mon; and 10 mg daily rest of week  (0 % change)    Instructed patient to follow up no later than: two weeks    Jeimy verbalizes understanding and agrees to warfarin dosing plan.    Instructed to call the [facility] for any changes, questions or concerns. (#[phone])   ?   Karen Rivera RN    Subjective/Objective:      Jeimy Lynch, a 38 y.o. female is on warfarin.     Jeimy reports:     Home warfarin dose: verbally confirmed home dose with Jeimy and updated on anticoagulation calendar     Missed doses: maybe 1     Medication changes:  No     S/S of bleeding or thromboembolism:  No     New Injury or illness:  No     Changes in diet or alcohol consumption:  No     Upcoming surgery, procedure or cardioversion:  No    Anticoagulation Episode Summary     Current INR goal:   2.0-3.0   TTR:   69.9 % (4.1 y)   Next INR check:   3/13/2019   INR from last check:   1.70! (2/27/2019)   Weekly max warfarin dose:      Target end date:      INR check location:      Preferred lab:      Send INR reminders to:   ANTICOAGULATION POOL A (WBY,WBE,MID,RSC)    Indications    Antiphospholipid antibody syndrome (H) [D68.61]  Factor V Leiden Mutation [D68.59]           Comments:            Anticoagulation Care Providers     Provider Role Specialty Phone number    Ayush Juan DO  Internal Medicine [phone]

## 2021-06-25 NOTE — TELEPHONE ENCOUNTER
ANTICOAGULATION  MANAGEMENT    Assessment     Today's INR result of 3.0 is Therapeutic (goal INR of 2.0-3.0)        Warfarin taken as previously instructed    No new diet changes affecting INR    No new medication/supplements affecting INR    Continues to tolerate warfarin with no reported s/s of bleeding or thromboembolism     Previous INR was Therapeutic    Plan:     Spoke with Jeimy regarding INR result and instructed:     Warfarin Dosing Instructions:  Continue current warfarin dose 15 mg daily on mon; and 10 mg daily rest of week  (0 % change)    Instructed patient to follow up no later than: 2-3 weeks    Jeimy verbalizes understanding and agrees to warfarin dosing plan.    Instructed to call the [facility] for any changes, questions or concerns. (#[phone])   ?   Karen Rivera RN    Subjective/Objective:      Jeimy Lynch, a 38 y.o. female is on warfarin.     Jeimy reports:     Home warfarin dose: verbally confirmed home dose with Jeimy and updated on anticoagulation calendar     Missed doses: No     Medication changes:  No     S/S of bleeding or thromboembolism:  No     New Injury or illness:  No     Changes in diet or alcohol consumption:  No     Upcoming surgery, procedure or cardioversion:  No    Anticoagulation Episode Summary     Current INR goal:   2.0-3.0   TTR:   70.0 % (4.2 y)   Next INR check:   4/17/2019   INR from last check:   3.00 (3/20/2019)   Weekly max warfarin dose:      Target end date:      INR check location:      Preferred lab:      Send INR reminders to:   ANTICOAGULATION POOL A (WBY,WBE,MID,RSC)    Indications    Antiphospholipid antibody syndrome (H) [D68.61]  Factor V Leiden Mutation [D68.59]           Comments:            Anticoagulation Care Providers     Provider Role Specialty Phone number    Ayush Juan DO  Internal Medicine [phone]

## 2021-06-27 ENCOUNTER — HEALTH MAINTENANCE LETTER (OUTPATIENT)
Age: 41
End: 2021-06-27

## 2021-07-01 ENCOUNTER — COMMUNICATION - HEALTHEAST (OUTPATIENT)
Dept: ANTICOAGULATION | Facility: CLINIC | Age: 41
End: 2021-07-01

## 2021-07-04 NOTE — TELEPHONE ENCOUNTER
Telephone Encounter by Karen Rivera RN at 7/1/2021  1:03 PM     Author: Karen Rivera RN Service: -- Author Type: Registered Nurse    Filed: 7/1/2021  1:04 PM Encounter Date: 7/1/2021 Status: Signed    : Karen Rivera RN (Registered Nurse)       ANTICOAGULATION  MANAGEMENT PROGRAM    Jeimy Lynch is overdue for INR check.     Left message to call and schedule INR appointment as soon as possible.      Karen Rivera RN

## 2021-07-04 NOTE — TELEPHONE ENCOUNTER
Telephone Encounter by Karen Rivera RN at 6/16/2021  1:46 PM     Author: Karen Rivera RN Service: -- Author Type: Registered Nurse    Filed: 6/16/2021  2:32 PM Encounter Date: 6/16/2021 Status: Signed    : Karen Rivera RN (Registered Nurse)       ANTICOAGULATION  MANAGEMENT    Assessment     Today's INR result of 1.8 is Subtherapeutic (goal INR of 2.0-3.0)        Missed dose(s) may be affecting INR only had 5 mg on wed    No new diet changes affecting INR    No new medication/supplements affecting INR    Continues to tolerate warfarin with no reported s/s of bleeding or thromboembolism     Previous INR was Therapeutic    Plan:     Spoke on phone with Jeimy regarding INR result and instructed:      Warfarin Dosing Instructions:  Continue current warfarin dose 5 mg daily on sun/thu; and 10 mg daily rest of week  (0 % change)    Instructed patient to follow up no later than: two weeks      Jeimy verbalizes understanding and agrees to warfarin dosing plan.    Instructed to call the [facility] for any changes, questions or concerns. (#[phone])   ?   Karen Rivera RN    Subjective/Objective:      Jeimy Lynch, a 41 y.o. female is on warfarin. Jeimy Munson reports:     Home warfarin dose: as updated on anticoagulation calendar per template     Missed doses: No     Medication changes:  No     S/S of bleeding or thromboembolism:  No     New Injury or illness:  No     Changes in diet or alcohol consumption:  No     Upcoming surgery, procedure or cardioversion:  No    Anticoagulation Episode Summary     Current INR goal:  2.0-3.0   TTR:  62.5 % (1 y)   Next INR check:  6/30/2021   INR from last check:  1.80 (6/16/2021)   Weekly max warfarin dose:     Target end date:     INR check location:     Preferred lab:     Send INR reminders to:  [facility]    Indications    Antiphospholipid antibody syndrome (H) (Resolved) [D68.61]  Factor V Leiden Mutation [D68.59]           Comments:            Anticoagulation Care Providers     Provider Role Specialty Phone number    Ayush Juan,   Internal Medicine [phone]

## 2021-07-15 ENCOUNTER — DOCUMENTATION ONLY (OUTPATIENT)
Dept: INTERNAL MEDICINE | Facility: CLINIC | Age: 41
End: 2021-07-15

## 2021-07-15 NOTE — PROGRESS NOTES
Patient is overdue for INR.   Updating check date to today so patient shows on our reminder list.      Patient was due for INR on 6/30/21    Overdue INR reminder updated    Next attempt: 2nd reminder call

## 2021-07-27 DIAGNOSIS — Z79.01 LONG TERM CURRENT USE OF ANTICOAGULANT THERAPY: ICD-10-CM

## 2021-07-27 DIAGNOSIS — D68.61 ANTIPHOSPHOLIPID ANTIBODY SYNDROME (H): Primary | ICD-10-CM

## 2021-07-27 DIAGNOSIS — D68.59 PRIMARY HYPERCOAGULABLE STATE (H): ICD-10-CM

## 2021-07-28 ENCOUNTER — ANTICOAGULATION THERAPY VISIT (OUTPATIENT)
Dept: ANTICOAGULATION | Facility: CLINIC | Age: 41
End: 2021-07-28

## 2021-07-28 ENCOUNTER — LAB (OUTPATIENT)
Dept: LAB | Facility: CLINIC | Age: 41
End: 2021-07-28
Payer: COMMERCIAL

## 2021-07-28 DIAGNOSIS — D68.59 PRIMARY HYPERCOAGULABLE STATE (H): Primary | ICD-10-CM

## 2021-07-28 DIAGNOSIS — Z79.01 LONG TERM CURRENT USE OF ANTICOAGULANT THERAPY: ICD-10-CM

## 2021-07-28 DIAGNOSIS — D68.61 ANTIPHOSPHOLIPID ANTIBODY SYNDROME (H): ICD-10-CM

## 2021-07-28 DIAGNOSIS — D68.59 PRIMARY HYPERCOAGULABLE STATE (H): ICD-10-CM

## 2021-07-28 LAB — INR BLD: 2.8 (ref 0.9–1.1)

## 2021-07-28 PROCEDURE — 85610 PROTHROMBIN TIME: CPT

## 2021-07-28 PROCEDURE — 36415 COLL VENOUS BLD VENIPUNCTURE: CPT

## 2021-07-28 NOTE — PROGRESS NOTES
ANTICOAGULATION MANAGEMENT     Jeimy Lynch 41 year old female is on warfarin with therapeutic INR result. (Goal INR 2.0-3.0)    Recent labs: (last 7 days)     07/28/21  0751   INR 2.8*       ASSESSMENT     Source(s): Chart Review and Patient/Caregiver Call     Warfarin doses taken: Warfarin taken as instructed  Diet: No new diet changes identified  New illness, injury, or hospitalization: No  Medication/supplement changes: None noted  Signs or symptoms of bleeding or clotting: No  Previous INR: Subtherapeutic  Additional findings: None     PLAN     Recommended plan for no diet, medication or health factor changes affecting INR     Dosing Instructions: Continue your current warfarin dose with next INR in 4 weeks       Summary  As of 7/28/2021    Full warfarin instructions:  5 mg every Sun, Thu; 10 mg all other days   Next INR check:  8/25/2021             Detailed voice message left for Jeimy with dosing instructions and follow up date.     Contact [phone] to schedule and with any changes, questions or concerns.     Education provided: None required    Plan made per [facility] anticoagulation protocol    Karen Rivera RN  Anticoagulation Clinic  7/28/2021    _______________________________________________________________________     Anticoagulation Episode Summary     Current INR goal:  2.0-3.0   TTR:  68.6 % (1 y)   Target end date:     Send INR reminders to:  [facility]    Indications    Factor V Leiden Mutation [D68.59]           Comments:           Anticoagulation Care Providers     Provider Role Specialty Phone number    Ayush Juan, DO  Internal Medicine [phone]

## 2021-08-16 ENCOUNTER — OFFICE VISIT (OUTPATIENT)
Dept: INTERNAL MEDICINE | Facility: CLINIC | Age: 41
End: 2021-08-16
Payer: COMMERCIAL

## 2021-08-16 ENCOUNTER — NURSE TRIAGE (OUTPATIENT)
Dept: NURSING | Facility: CLINIC | Age: 41
End: 2021-08-16

## 2021-08-16 VITALS
HEART RATE: 86 BPM | OXYGEN SATURATION: 98 % | SYSTOLIC BLOOD PRESSURE: 112 MMHG | BODY MASS INDEX: 20.82 KG/M2 | WEIGHT: 129 LBS | DIASTOLIC BLOOD PRESSURE: 70 MMHG

## 2021-08-16 DIAGNOSIS — R19.5 LOOSE STOOLS: Primary | ICD-10-CM

## 2021-08-16 DIAGNOSIS — F17.200 TOBACCO DEPENDENCE: ICD-10-CM

## 2021-08-16 DIAGNOSIS — R20.8 DYSESTHESIA: ICD-10-CM

## 2021-08-16 DIAGNOSIS — R35.0 URINARY FREQUENCY: ICD-10-CM

## 2021-08-16 LAB
ALBUMIN UR-MCNC: NEGATIVE MG/DL
APPEARANCE UR: CLEAR
BACTERIA #/AREA URNS HPF: ABNORMAL /HPF
BILIRUB UR QL STRIP: NEGATIVE
COLOR UR AUTO: YELLOW
FOLATE SERPL-MCNC: 9.1 NG/ML
GLUCOSE UR STRIP-MCNC: NEGATIVE MG/DL
HGB UR QL STRIP: NEGATIVE
KETONES UR STRIP-MCNC: NEGATIVE MG/DL
LEUKOCYTE ESTERASE UR QL STRIP: ABNORMAL
NITRATE UR QL: NEGATIVE
PH UR STRIP: 7 [PH] (ref 5–8)
RBC #/AREA URNS AUTO: ABNORMAL /HPF
SP GR UR STRIP: 1.01 (ref 1–1.03)
SQUAMOUS #/AREA URNS AUTO: ABNORMAL /LPF
UROBILINOGEN UR STRIP-ACNC: 0.2 E.U./DL
VIT B12 SERPL-MCNC: 344 PG/ML (ref 213–816)
WBC #/AREA URNS AUTO: ABNORMAL /HPF

## 2021-08-16 PROCEDURE — 82746 ASSAY OF FOLIC ACID SERUM: CPT | Performed by: NURSE PRACTITIONER

## 2021-08-16 PROCEDURE — 99214 OFFICE O/P EST MOD 30 MIN: CPT | Performed by: NURSE PRACTITIONER

## 2021-08-16 PROCEDURE — 36415 COLL VENOUS BLD VENIPUNCTURE: CPT | Performed by: NURSE PRACTITIONER

## 2021-08-16 PROCEDURE — 82607 VITAMIN B-12: CPT | Performed by: NURSE PRACTITIONER

## 2021-08-16 PROCEDURE — 81001 URINALYSIS AUTO W/SCOPE: CPT | Performed by: NURSE PRACTITIONER

## 2021-08-16 PROCEDURE — 83516 IMMUNOASSAY NONANTIBODY: CPT | Performed by: NURSE PRACTITIONER

## 2021-08-16 RX ORDER — LAMOTRIGINE 25 MG/1
TABLET, CHEWABLE ORAL
COMMUNITY
Start: 2021-05-16

## 2021-08-16 RX ORDER — WARFARIN SODIUM 5 MG/1
TABLET ORAL
COMMUNITY
Start: 2021-03-13 | End: 2021-10-07

## 2021-08-16 RX ORDER — LORAZEPAM 2 MG/1
TABLET ORAL
COMMUNITY
Start: 2021-08-15

## 2021-08-16 RX ORDER — BUPROPION HYDROCHLORIDE 150 MG/1
150 TABLET ORAL EVERY MORNING
COMMUNITY
Start: 2021-08-15

## 2021-08-16 NOTE — TELEPHONE ENCOUNTER
Scheduling calling to report that pt was seen in ED this weekend and wants to know if we need to triage the call due to this or to schedule. Pt not experiencing any new symptoms. Scheduling a follow up appointment today. No triage call needed. Scheduling will make an appointment for pt for today.     Hannah Laws RN  [facility] Nurse Advisor 7:04 AM 8/16/2021    Reason for Disposition    Requesting regular office appointment    Additional Information    Negative: [1] Caller is not with the adult (patient) AND [2] reporting urgent symptoms    Negative: Lab result questions    Negative: Medication questions    Negative: Caller can't be reached by phone    Negative: Caller has already spoken to PCP or another triager    Negative: RN needs further essential information from caller in order to complete triage    Protocols used: INFORMATION ONLY CALL - NO TRIAGE-A-

## 2021-08-16 NOTE — PROGRESS NOTES
Internal Medicine Office Visit  [facility]   Patient Name: Jeimy Lynch  Patient Age: 41 year old  YOB: 1980  MRN: [id number]    Date of Visit: 8/16/2021  Patient presents with:  Follow Up: ER 8/14/21, tingling/numbness in hands with confusion            Assessment / Plan / Medical Decision Making:    Problem List Items Addressed This Visit        Nervous and Auditory    Tobacco dependence     Patient is a light smoker but complete cessation is advised.  She is not interested in smoking cessation assistance at this time           Other Visit Diagnoses     Loose stools    -  Primary    Symptoms have been ongoing since January.  We will proceed with a stool evaluation and celiac blood test.  Follow-up in the office after the above test.  Consider colonoscopy    Relevant Orders    Tissue transglutaminase dina IgA and IgG (Completed)    Folate (Completed)    Vitamin B12 (Completed)    Enteric Bacteria and Virus Panel by LAURITA Stool    Urinary frequency        Relevant Orders    UA Macro with Reflex to Micro and Culture - lab collect (Completed)    Urine Microscopic (Completed)    UA to rule out a urinary tract infection.  She is advised that her blood sugar was normal when checked in the urgent care evaluation.    Dysesthesia        2 short-lived episodes.  Negative head CT.  We will evaluate folic acid and B12 given moderate alcohol intake.  Suspect this may have been either related to anxiety or due to posture sitting at the desk.  She does not have signs of carpal tunnel syndrome although this was considered as well.           I am having Jeimy Lynch maintain her buPROPion, lamoTRIgine, LORazepam, and warfarin ANTICOAGULANT.          Orders Placed This Encounter   Procedures     Tissue transglutaminase dina IgA and IgG     Folate     Vitamin B12     UA Macro with Reflex to Micro and Culture - lab collect     Urine Microscopic   Followup: Return in about 2 weeks (around  8/30/2021) for establish care visit . earlier if needed.      Seble Hoyos, ALISA, CNP        HPI:  Jeimy Lynch is a 41 year old year old with a PMH of Factor V Leiden mutation, tobacco use, anxiety, and depression who presents to the office today for follow up of an [facility] visit. She was seen because she had a headache and tingling in the hands as well as confusion and mental slowness. She had a normal head CT. The symptoms lasted about 5 minutes and then entirely resolved. Her hands felt numb twice, she has never experienced this prior to the UR visit. She sits at a desk for her job. Notices some weakness in the left hand.     She adds that since January she has had loose stools which was a change for her. She has 2-4 stools per day, they are not all loose. No blood in the stool or dark stools. No stomach pain or cramps.  She has 2 beers nightly, she cut back on alcohol in January from 5 drinks most days to 2. No prior issues with alcohol abuse, does not impact her work/relationships.     She has been urinating frequently and she thinks this is out of proportion to the amount of fluids that she is drinking.     Stress/anxiety was reviewed. 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 Engaged to her s/o, Enid, who is going through some health issues herself. Just got a puppy recently. Some family relationship issues with her mother but overall these stressors are not causing her to feel overwhelmed.     Health Maintenance Review  Health Maintenance   Topic Date Due     PREVENTIVE CARE VISIT  Never done     ANNUAL REVIEW OF HM ORDERS  Never done     ADVANCE CARE PLANNING  Never done     DEPRESSION ACTION PLAN  Never done     Pneumococcal Vaccine: Pediatrics (0 to 5 Years) and At-Risk Patients (6 to 64 Years) (1 of 2 - PPSV23) Never done     HIV SCREENING  Never done     HEPATITIS C SCREENING  Never done     PHQ-9  11/27/2020     PAP  04/16/2021     INFLUENZA VACCINE (1) 09/01/2021     DTAP/TDAP/TD  IMMUNIZATION (4 - Td or Tdap) 03/30/2022     COVID-19 Vaccine  Completed     IPV IMMUNIZATION  Aged Out     MENINGITIS IMMUNIZATION  Aged Out     HEPATITIS B IMMUNIZATION  Aged Out       Current Scheduled Meds:  Outpatient Encounter Medications as of 8/16/2021   Medication Sig Dispense Refill     buPROPion (WELLBUTRIN XL) 150 MG 24 hr tablet Take 150 mg by mouth every morning       lamoTRIgine (LAMICTAL) 25 MG chewable tablet TAKE 8 TABLETS BY MOUTH DAILY       LORazepam (ATIVAN) 2 MG tablet TAKE 1 TABLET BY MOUTH TWICE A DAY AND TWO TABLETS WEEKLY       warfarin ANTICOAGULANT (COUMADIN) 5 MG tablet TAKE 2 TO 3 TABLETS BY MOUTH DAILY AS DIRECTED       [DISCONTINUED] ROBITUSSIN A-C  MG/5ML OR SYRP ONE TO TWO TEASPOONS EVER 4 HOURS, AS NEEDED FOR COUGH 8 OZ 0     [DISCONTINUED] UNKNOWN MED DOSAGE BCP       [DISCONTINUED] WELLBUTRIN XL# 300 MG OR TB24 1 TABLET DAILY       No facility-administered encounter medications on file as of 8/16/2021.           Objective / Physical Examination:    EXAM: CT HEAD BRAIN WO  LOCATION: [facility] [place]  DATE/TIME: 8/14/2021 9:09 AM    INDICATION: ARM TINGLING  COMPARISON: None.  TECHNIQUE: Routine CT Head without IV contrast. Multiplanar reformats. Dose reduction techniques were used.    FINDINGS:  INTRACRANIAL CONTENTS: No intracranial hemorrhage, extraaxial collection, or mass effect. No CT evidence of acute infarct. Normal parenchymal attenuation. Normal ventricles and sulci.     VISUALIZED ORBITS/SINUSES/MASTOIDS: No intraorbital abnormality. Mild mucosal thickening scattered about the paranasal sinuses. No middle ear or mastoid effusion.    BONES/SOFT TISSUES: No acute abnormality.    IMPRESSION:  1. Normal head CT.      Vitals:    08/16/21 1013   BP: 112/70   Pulse: 86   SpO2: 98%   Weight: 58.5 kg (129 lb)     Wt Readings from Last 3 Encounters:   08/16/21 58.5 kg (129 lb)   02/03/21 64.6 kg (142 lb 7 oz)   11/21/18 59.1 kg (130 lb 3 oz)     Body mass index is  20.82 kg/m .     Constitutional: In no apparent distress  Respiratory: Clear to auscultation bilaterally. Normal inspiratory and expiratory effort  Cardiovascular: Regular rate and rhythm   Skin: Warm and dry. No rashes  Neuro: Normal gait. Negative Phalen's sign   Psych: Alert and oriented x3.

## 2021-08-17 ENCOUNTER — APPOINTMENT (OUTPATIENT)
Dept: LAB | Facility: CLINIC | Age: 41
End: 2021-08-17
Payer: COMMERCIAL

## 2021-08-17 PROCEDURE — 87506 IADNA-DNA/RNA PROBE TQ 6-11: CPT | Performed by: NURSE PRACTITIONER

## 2021-08-18 LAB
C COLI+JEJUNI+LARI FUSA STL QL NAA+PROBE: NOT DETECTED
EC STX1 GENE STL QL NAA+PROBE: NOT DETECTED
EC STX2 GENE STL QL NAA+PROBE: NOT DETECTED
NOROV GI+II ORF1-ORF2 JNC STL QL NAA+PR: NOT DETECTED
RVA NSP5 STL QL NAA+PROBE: NOT DETECTED
SALMONELLA SP RPOD STL QL NAA+PROBE: NOT DETECTED
SHIGELLA SP+EIEC IPAH STL QL NAA+PROBE: NOT DETECTED
TTG IGA SER-ACNC: 0.4 U/ML
TTG IGG SER-ACNC: 0.6 U/ML
V CHOL+PARA RFBL+TRKH+TNAA STL QL NAA+PR: NOT DETECTED
Y ENTERO RECN STL QL NAA+PROBE: NOT DETECTED

## 2021-08-18 NOTE — ASSESSMENT & PLAN NOTE
Patient is a light smoker but complete cessation is advised.  She is not interested in smoking cessation assistance at this time

## 2021-08-26 ENCOUNTER — TELEPHONE (OUTPATIENT)
Dept: INTERNAL MEDICINE | Facility: CLINIC | Age: 41
End: 2021-08-26

## 2021-08-26 NOTE — TELEPHONE ENCOUNTER
ANTICOAGULATION     Jeimy Lynch is overdue for INR check.      Left message for patient to call and schedule lab appointment as soon as possible. If returning call, please schedule.  or have INR checked at upcoming appointment on 9/1/21    Gianna Allen RN

## 2021-09-16 ENCOUNTER — TELEPHONE (OUTPATIENT)
Dept: ANTICOAGULATION | Facility: CLINIC | Age: 41
End: 2021-09-16

## 2021-09-16 NOTE — TELEPHONE ENCOUNTER
ANTICOAGULATION     Jeimy Lynch is overdue for INR check.      Left message for patient to call and schedule lab appointment as soon as possible. If returning call, please schedule.     Karen Rivera RN

## 2021-09-23 ENCOUNTER — TELEPHONE (OUTPATIENT)
Dept: ANTICOAGULATION | Facility: CLINIC | Age: 41
End: 2021-09-23

## 2021-09-23 NOTE — TELEPHONE ENCOUNTER
ANTICOAGULATION     Jeimy Lynch is overdue for INR check.      Left message for patient to call and schedule lab appointment as soon as possible. If returning call, please schedule.     Abigail Mcbride RN

## 2021-10-07 ENCOUNTER — OFFICE VISIT (OUTPATIENT)
Dept: INTERNAL MEDICINE | Facility: CLINIC | Age: 41
End: 2021-10-07
Payer: COMMERCIAL

## 2021-10-07 ENCOUNTER — ANTICOAGULATION THERAPY VISIT (OUTPATIENT)
Dept: ANTICOAGULATION | Facility: CLINIC | Age: 41
End: 2021-10-07

## 2021-10-07 VITALS
OXYGEN SATURATION: 99 % | HEIGHT: 66 IN | SYSTOLIC BLOOD PRESSURE: 110 MMHG | DIASTOLIC BLOOD PRESSURE: 60 MMHG | HEART RATE: 97 BPM | BODY MASS INDEX: 20.25 KG/M2 | WEIGHT: 126 LBS

## 2021-10-07 DIAGNOSIS — H69.91 DYSFUNCTION OF RIGHT EUSTACHIAN TUBE: Primary | ICD-10-CM

## 2021-10-07 DIAGNOSIS — F17.200 TOBACCO DEPENDENCE: ICD-10-CM

## 2021-10-07 DIAGNOSIS — D68.59 PRIMARY HYPERCOAGULABLE STATE (H): Primary | ICD-10-CM

## 2021-10-07 DIAGNOSIS — D68.59 PRIMARY HYPERCOAGULABLE STATE (H): ICD-10-CM

## 2021-10-07 DIAGNOSIS — R35.0 URINARY FREQUENCY: ICD-10-CM

## 2021-10-07 DIAGNOSIS — D68.61 ANTIPHOSPHOLIPID ANTIBODY SYNDROME (H): ICD-10-CM

## 2021-10-07 DIAGNOSIS — R19.7 DIARRHEA, UNSPECIFIED TYPE: ICD-10-CM

## 2021-10-07 DIAGNOSIS — Z79.01 LONG TERM CURRENT USE OF ANTICOAGULANT THERAPY: ICD-10-CM

## 2021-10-07 LAB — INR BLD: 1.4 (ref 0.9–1.1)

## 2021-10-07 PROCEDURE — 85610 PROTHROMBIN TIME: CPT | Performed by: NURSE PRACTITIONER

## 2021-10-07 PROCEDURE — 99214 OFFICE O/P EST MOD 30 MIN: CPT | Performed by: NURSE PRACTITIONER

## 2021-10-07 PROCEDURE — 36415 COLL VENOUS BLD VENIPUNCTURE: CPT | Performed by: NURSE PRACTITIONER

## 2021-10-07 RX ORDER — WARFARIN SODIUM 5 MG/1
5-10 TABLET ORAL DAILY
Qty: 60 TABLET | Refills: 11 | Status: SHIPPED | OUTPATIENT
Start: 2021-10-07 | End: 2022-10-31

## 2021-10-07 RX ORDER — FLUTICASONE PROPIONATE 50 MCG
1 SPRAY, SUSPENSION (ML) NASAL DAILY
Qty: 9.9 ML | Refills: 0 | Status: CANCELLED | OUTPATIENT
Start: 2021-10-07

## 2021-10-07 ASSESSMENT — ANXIETY QUESTIONNAIRES
GAD7 TOTAL SCORE: 4
GAD7 TOTAL SCORE: 4
8. IF YOU CHECKED OFF ANY PROBLEMS, HOW DIFFICULT HAVE THESE MADE IT FOR YOU TO DO YOUR WORK, TAKE CARE OF THINGS AT HOME, OR GET ALONG WITH OTHER PEOPLE?: NOT DIFFICULT AT ALL
3. WORRYING TOO MUCH ABOUT DIFFERENT THINGS: SEVERAL DAYS
6. BECOMING EASILY ANNOYED OR IRRITABLE: NOT AT ALL
4. TROUBLE RELAXING: NOT AT ALL
7. FEELING AFRAID AS IF SOMETHING AWFUL MIGHT HAPPEN: NOT AT ALL
GAD7 TOTAL SCORE: 4
2. NOT BEING ABLE TO STOP OR CONTROL WORRYING: SEVERAL DAYS
5. BEING SO RESTLESS THAT IT IS HARD TO SIT STILL: NOT AT ALL
1. FEELING NERVOUS, ANXIOUS, OR ON EDGE: MORE THAN HALF THE DAYS
7. FEELING AFRAID AS IF SOMETHING AWFUL MIGHT HAPPEN: NOT AT ALL

## 2021-10-07 ASSESSMENT — PATIENT HEALTH QUESTIONNAIRE - PHQ9
SUM OF ALL RESPONSES TO PHQ QUESTIONS 1-9: 3
10. IF YOU CHECKED OFF ANY PROBLEMS, HOW DIFFICULT HAVE THESE PROBLEMS MADE IT FOR YOU TO DO YOUR WORK, TAKE CARE OF THINGS AT HOME, OR GET ALONG WITH OTHER PEOPLE: NOT DIFFICULT AT ALL
SUM OF ALL RESPONSES TO PHQ QUESTIONS 1-9: 3

## 2021-10-07 ASSESSMENT — MIFFLIN-ST. JEOR: SCORE: 1258.28

## 2021-10-07 NOTE — PATIENT INSTRUCTIONS
Diarrhea-try increasing fiber and trial fiber supplement. If this doesn't help, may consider colonoscopy.     Urinary frequency-try decreasing coffee, soda, chocolate, alcohol; try Kegel exercises to increase pelvic floor strength and decrease dribbling     Ear pressure-Flonase once daily each nostril

## 2021-10-07 NOTE — PROGRESS NOTES
Internal Medicine Office Visit  [facility]   Patient Name: Jeimy Lynch  Patient Age: 41 year old  YOB: 1980  MRN: [id number]    Date of Visit: 10/7/2021  Patient presents with:  Ear Problem: pressure right ear  with a small headache  RECHECK: loose stools with some diarrhea         Assessment / Plan / Medical Decision Making:    Problem List Items Addressed This Visit        Nervous and Auditory    Tobacco dependence     -1-2 cigarettes per day   -does not have interest in cessation today             Digestive    Diarrhea     -increase fiber intake/trial OTC fiber supplement   -consider colonoscopy if no improvement  -previous lab work negative (Ig/IgG, viral/bacterial cause)             Urinary    Urinary frequency     -decrease bladder irritants (caffeine, chocolate, alcohol)  -Kegel exercises             Hematologic    Factor V Leiden Mutation    Relevant Medications    warfarin ANTICOAGULANT (COUMADIN) 5 MG tablet       Other    Long term current use of anticoagulant therapy      Other Visit Diagnoses     Dysfunction of right eustachian tube    -  Primary    -trial Flonase nasal spray daily one spray each nostril     Antiphospholipid antibody syndrome (H)        INR today           I have changed Jeimy Lynch's warfarin ANTICOAGULANT. I am also having her maintain her buPROPion, lamoTRIgine, and LORazepam.          No orders of the defined types were placed in this encounter.  Followup: Return in about 4 weeks (around 11/4/2021) for Follow up. earlier if needed.    Patient was seen with NP student, Es Valdivia. I agree with assessment and plan.  Seble Hoyos DNP        HPI:  Jeimy Lynch is a 41 year old year old who presents to the office today for follow up.     She denies any recurrence of dysesthesias since last office visit.     She does report ongoing loose stools since January that are most frequent in early AM upon waking with about 1-3 occurrences in the  "morning. They occur prior to eating or drinking and she doesn't notice any changes in pattern with changes in diet, such as limiting lactose. She denies any hematochezia, abdominal pain or discomfort, or abdominal distension.     She also reports urinary frequency that is disruptive to personal life. She goes about every hour to hour and half and reports occasional dribbling. She reports drinking about 3-4 cups of coffee per day and 1-2 beers at night. She denies hematuria, burning sensation when urinating, foul smelling urine, or pelvic pain.     Jeimy has also been having right ear \"pressure\" for the past 2 weeks and has noticed a dull headache in temporal region above right ear that comes and goes with the ear pressure and increases when chewing. She has had a cold and some morning congestion as well. She did note some clear liquid drainage from her right ear a few days ago.       Review of Systems- pertinent positive in bold:  Constitutional: Fever, chills, night sweats, fatigue  Eyes: change in vision, blurred vision  Ears, nose, mouth, throat: change in hearing, right ear pressure and clear drainage  Respiratory: shortness of breath  Cardiovascular: chest pain, palpitations   Gastrointestinal: abdominal pain, heartburn/indigestion, nausea/vomiting, change in bowel habits, constipation or diarrhea, rectal bleeding/dark stools  Urinary: painful urination, frequent urination, urinary urgency with occasional dribbling, blood in urine/dark urine  Neurologic/emotional:  numbness/tingling, anxiety related to moving that patient reports as being controlled and manageable, mood swings    Health Maintenance Review  Health Maintenance   Topic Date Due     PREVENTIVE CARE VISIT  Never done     ANNUAL REVIEW OF HM ORDERS  Never done     ADVANCE CARE PLANNING  Never done     DEPRESSION ACTION PLAN  Never done     Pneumococcal Vaccine: Pediatrics (0 to 5 Years) and At-Risk Patients (6 to 64 Years) (1 of 2 - PPSV23) Never " "done     HIV SCREENING  Never done     HEPATITIS C SCREENING  Never done     PHQ-9  11/27/2020     PAP  04/16/2021     INFLUENZA VACCINE (1) 09/01/2021     DTAP/TDAP/TD IMMUNIZATION (4 - Td or Tdap) 03/30/2022     COVID-19 Vaccine  Completed     IPV IMMUNIZATION  Aged Out     MENINGITIS IMMUNIZATION  Aged Out     HEPATITIS B IMMUNIZATION  Aged Out       Current Scheduled Meds:  Outpatient Encounter Medications as of 10/7/2021   Medication Sig Dispense Refill     buPROPion (WELLBUTRIN XL) 150 MG 24 hr tablet Take 150 mg by mouth every morning       lamoTRIgine (LAMICTAL) 25 MG chewable tablet TAKE 8 TABLETS BY MOUTH DAILY       LORazepam (ATIVAN) 2 MG tablet TAKE 1 TABLET BY MOUTH TWICE A DAY AND TWO TABLETS WEEKLY       warfarin ANTICOAGULANT (COUMADIN) 5 MG tablet Take 1-2 tablets (5-10 mg) by mouth daily 60 tablet 11     [DISCONTINUED] warfarin ANTICOAGULANT (COUMADIN) 5 MG tablet TAKE 2 TO 3 TABLETS BY MOUTH DAILY AS DIRECTED       No facility-administered encounter medications on file as of 10/7/2021.         Answers for HPI/ROS submitted by the patient on 10/7/2021  If you checked off any problems, how difficult have these problems made it for you to do your work, take care of things at home, or get along with other people?: Not difficult at all  PHQ9 TOTAL SCORE: 3  HELEN 7 TOTAL SCORE: 4      Objective / Physical Examination:  Vitals:    10/07/21 0704   BP: 110/60   BP Location: Right arm   Patient Position: Sitting   Pulse: 97   SpO2: 99%   Weight: 57.2 kg (126 lb)   Height: 1.676 m (5' 6\")     Wt Readings from Last 3 Encounters:   10/07/21 57.2 kg (126 lb)   08/16/21 58.5 kg (129 lb)   02/03/21 64.6 kg (142 lb 7 oz)     Body mass index is 20.34 kg/m .     Constitutional: In no apparent distress  Eyes: Conjunctivae clear. Non-icteric.   ENT: right Tympanic membrane effusion; no light reflex present; landmarks not well visualized; no erythema or discharge    Respiratory: Clear to auscultation bilaterally. " Normal inspiratory and expiratory effort  Cardiovascular: Regular rate and rhythm. No murmurs, rubs, or gallops. No edema.   Skin: Warm and dry. Without suspicious looking lesions  Neuro: Normal gait  Psych: Alert and oriented x3.

## 2021-10-07 NOTE — PROGRESS NOTES
ANTICOAGULATION MANAGEMENT     Jeimy Lynch 41 year old female is on warfarin with subtherapeutic INR result. (Goal INR 2.0-3.0)    Recent labs: (last 7 days)     10/07/21  0755   INR 1.4*       ASSESSMENT     Source(s): Chart Review and Template     Warfarin doses taken: Missed dose(s) may be affecting INR  Diet: No new diet changes identified  New illness, injury, or hospitalization: Yes: ov today for earache and recent loose stools  Medication/supplement changes: flonase  Signs or symptoms of bleeding or clotting: No  Previous INR: Therapeutic last visit; previously outside of goal range  Additional findings: None     PLAN     Recommended plan for temporary change(s) affecting INR     Dosing Instructions: Booster dose then continue your current warfarin dose with next INR in 1 week       Summary  As of 10/7/2021    Full warfarin instructions:  10/7: 15 mg; Otherwise 5 mg every Sun, Thu; 10 mg all other days   Next INR check:  10/14/2021             Detailed voice message left for Jeimy with dosing instructions and follow up date.     Contact [phone] to schedule and with any changes, questions or concerns.     Education provided: None required    Plan made per [facility] anticoagulation protocol    Karen Rivera RN  Anticoagulation Clinic  10/7/2021    _______________________________________________________________________     Anticoagulation Episode Summary     Current INR goal:  2.0-3.0   TTR:  62.5 % (1 y)   Target end date:     Send INR reminders to:  [facility]    Indications    Factor V Leiden Mutation [D68.59]           Comments:           Anticoagulation Care Providers     Provider Role Specialty Phone number    Ayush Juan, DO  Internal Medicine [phone]

## 2021-10-07 NOTE — ASSESSMENT & PLAN NOTE
-increase fiber intake/trial OTC fiber supplement   -consider colonoscopy if no improvement  -previous lab work negative (Ig/IgG, viral/bacterial cause)

## 2021-10-08 ASSESSMENT — PATIENT HEALTH QUESTIONNAIRE - PHQ9: SUM OF ALL RESPONSES TO PHQ QUESTIONS 1-9: 3

## 2021-10-08 ASSESSMENT — ANXIETY QUESTIONNAIRES: GAD7 TOTAL SCORE: 4

## 2021-10-17 ENCOUNTER — HEALTH MAINTENANCE LETTER (OUTPATIENT)
Age: 41
End: 2021-10-17

## 2021-11-09 ENCOUNTER — OFFICE VISIT (OUTPATIENT)
Dept: INTERNAL MEDICINE | Facility: CLINIC | Age: 41
End: 2021-11-09
Payer: COMMERCIAL

## 2021-11-09 ENCOUNTER — ANTICOAGULATION THERAPY VISIT (OUTPATIENT)
Dept: ANTICOAGULATION | Facility: CLINIC | Age: 41
End: 2021-11-09

## 2021-11-09 VITALS
BODY MASS INDEX: 20.01 KG/M2 | SYSTOLIC BLOOD PRESSURE: 112 MMHG | WEIGHT: 124 LBS | HEART RATE: 90 BPM | OXYGEN SATURATION: 100 % | DIASTOLIC BLOOD PRESSURE: 60 MMHG

## 2021-11-09 DIAGNOSIS — Z79.01 LONG TERM CURRENT USE OF ANTICOAGULANT THERAPY: ICD-10-CM

## 2021-11-09 DIAGNOSIS — R19.7 DIARRHEA, UNSPECIFIED TYPE: ICD-10-CM

## 2021-11-09 DIAGNOSIS — D68.61 ANTIPHOSPHOLIPID ANTIBODY SYNDROME (H): ICD-10-CM

## 2021-11-09 DIAGNOSIS — Z23 NEED FOR VACCINATION: Primary | ICD-10-CM

## 2021-11-09 DIAGNOSIS — D68.59 PRIMARY HYPERCOAGULABLE STATE (H): Primary | ICD-10-CM

## 2021-11-09 DIAGNOSIS — D68.59 PRIMARY HYPERCOAGULABLE STATE (H): ICD-10-CM

## 2021-11-09 LAB — INR BLD: 2.2 (ref 0.9–1.1)

## 2021-11-09 PROCEDURE — 0064A COVID-19,PF,MODERNA (18+ YRS BOOSTER .25ML): CPT | Performed by: NURSE PRACTITIONER

## 2021-11-09 PROCEDURE — 99214 OFFICE O/P EST MOD 30 MIN: CPT | Performed by: NURSE PRACTITIONER

## 2021-11-09 PROCEDURE — 91306 COVID-19,PF,MODERNA (18+ YRS BOOSTER .25ML): CPT | Performed by: NURSE PRACTITIONER

## 2021-11-09 PROCEDURE — 36416 COLLJ CAPILLARY BLOOD SPEC: CPT | Performed by: NURSE PRACTITIONER

## 2021-11-09 PROCEDURE — 85610 PROTHROMBIN TIME: CPT | Performed by: NURSE PRACTITIONER

## 2021-11-09 NOTE — PROGRESS NOTES
ANTICOAGULATION MANAGEMENT     Jeimy DELGADO Syed 41 year old female is on warfarin with therapeutic INR result. (Goal INR 2.0-3.0)    Recent labs: (last 7 days)     11/09/21  1620   INR 2.2*       ASSESSMENT     Source(s): Chart Review and Patient/Caregiver Call       Warfarin doses taken: Warfarin taken as instructed    Diet: No new diet changes identified    New illness, injury, or hospitalization: No    Medication/supplement changes: None noted    Signs or symptoms of bleeding or clotting: No    Previous INR: Subtherapeutic    Additional findings: moving to [place] after thanksgiving     PLAN     Recommended plan for no diet, medication or health factor changes affecting INR     Dosing Instructions: Continue your current warfarin dose with next INR in 2 weeks       Summary  As of 11/9/2021    Full warfarin instructions:  5 mg every Sun, Thu; 10 mg all other days   Next INR check:  11/23/2021             Telephone call with Jeimy who verbalizes understanding and agrees to plan    Lab visit scheduled    Education provided: None required    Plan made per [facility] anticoagulation protocol    Karen Rivera RN  Anticoagulation Clinic  11/9/2021    _______________________________________________________________________     Anticoagulation Episode Summary     Current INR goal:  2.0-3.0   TTR:  55.7 % (1 y)   Target end date:     Send INR reminders to:  [facility]    Indications    Factor V Leiden Mutation [D68.59]           Comments:           Anticoagulation Care Providers     Provider Role Specialty Phone number    Ayush Juan, DO  Internal Medicine [phone]

## 2021-11-09 NOTE — PROGRESS NOTES
Internal Medicine Office Visit  [facility]   Patient Name: Jeimy Lynch  Patient Age: 41 year old  YOB: 1980  MRN: [id number]    Date of Visit: 11/9/2021  Patient presents with:  Follow Up           Assessment / Plan / Medical Decision Making:    Problem List Items Addressed This Visit        Digestive    Diarrhea     - Previous lab and stool tests negative  - No improvement with a trial of fiber  - Okay to use OTC Imodium   - I recommended that she undergo colonoscopy with random colon biopsy due to this ongoing stool change. She is moving soon, I advised that she establish care with a new provider in the area and then pursue this workup            Hematologic    Factor V Leiden Mutation     She is interested in home INR monitor which would be more convenient and allow for improved mobility/travel without worrying about finding a lab. Will discuss with INR management team about getting the order and authorization through her insurance             Other    Long term current use of anticoagulant therapy      Other Visit Diagnoses     Need for vaccination    -  Primary    Relevant Orders    COVID-19,PF,MODERNA (18+ YRS BOOSTER .25ML) (Completed)    Antiphospholipid antibody syndrome (H)        INR today           I am having Jeimy Lynch maintain her buPROPion, lamoTRIgine, LORazepam, and warfarin ANTICOAGULANT.          Orders Placed This Encounter   Procedures     COVID-19,PF,MODERNA (18+ YRS BOOSTER .25ML)   Followup: Return in about 3 months (around 2/9/2022) for Follow up. earlier if needed.    Visit was shadowed by NP student, Es Hoyos, DNP, APRN, CNP           HPI:  Jeimy Lynch is a 41 year old year old who presents to the office today for follow up. She has had ongoing diarrhea despite using a fiber supplement. She is moving at the end of the month.     H/o antiphospholipid antibody and Factor V leiden mutation with a h/o DVT for which she continues  anticoagulation. She is interested in a home INR monitor which would allow her to not have to worry about finding a lab for her INR. She actually asked about this at her last visit and I forgot to get this process started but will do so now.     Answers for HPI/ROS submitted by the patient on 11/9/2021  How many servings of fruits and vegetables do you eat daily?: 0-1  On average, how many sweetened beverages do you drink each day (Examples: soda, juice, sweet tea, etc.  Do NOT count diet or artificially sweetened beverages)?: 0  How many minutes a day do you exercise enough to make your heart beat faster?: 9 or less  How many days a week do you exercise enough to make your heart beat faster?: 3 or less  How many days per week do you miss taking your medication?: 0      Health Maintenance Review  Health Maintenance   Topic Date Due     PREVENTIVE CARE VISIT  Never done     ANNUAL REVIEW OF HM ORDERS  Never done     ADVANCE CARE PLANNING  Never done     DEPRESSION ACTION PLAN  Never done     Pneumococcal Vaccine: Pediatrics (0 to 5 Years) and At-Risk Patients (6 to 64 Years) (1 of 2 - PPSV23) Never done     HIV SCREENING  Never done     HEPATITIS C SCREENING  Never done     INFLUENZA VACCINE (1) 09/01/2021     DTAP/TDAP/TD IMMUNIZATION (4 - Td or Tdap) 03/30/2022     PHQ-9  04/07/2022     PAP  04/16/2023     COVID-19 Vaccine  Completed     IPV IMMUNIZATION  Aged Out     MENINGITIS IMMUNIZATION  Aged Out     HEPATITIS B IMMUNIZATION  Aged Out       Current Scheduled Meds:  Outpatient Encounter Medications as of 11/9/2021   Medication Sig Dispense Refill     buPROPion (WELLBUTRIN XL) 150 MG 24 hr tablet Take 150 mg by mouth every morning       lamoTRIgine (LAMICTAL) 25 MG chewable tablet TAKE 8 TABLETS BY MOUTH DAILY       LORazepam (ATIVAN) 2 MG tablet TAKE 1 TABLET BY MOUTH TWICE A DAY AND TWO TABLETS WEEKLY       warfarin ANTICOAGULANT (COUMADIN) 5 MG tablet Take 1-2 tablets (5-10 mg) by mouth daily 60 tablet 11      No facility-administered encounter medications on file as of 11/9/2021.         Objective / Physical Examination:  Vitals:    11/09/21 1525   BP: 112/60   Pulse: 90   SpO2: 100%   Weight: 56.2 kg (124 lb)     Wt Readings from Last 3 Encounters:   11/09/21 56.2 kg (124 lb)   10/07/21 57.2 kg (126 lb)   08/16/21 58.5 kg (129 lb)     Body mass index is 20.01 kg/m .     Constitutional: In no apparent distress  Psych: Alert and oriented x3.

## 2021-11-12 PROBLEM — D68.59 PRIMARY HYPERCOAGULABLE STATE (H): Status: ACTIVE | Noted: 2021-08-13

## 2021-11-12 NOTE — ASSESSMENT & PLAN NOTE
She is interested in home INR monitor which would be more convenient and allow for improved mobility/travel without worrying about finding a lab. Will discuss with INR management team about getting the order and authorization through her insurance

## 2021-11-12 NOTE — ASSESSMENT & PLAN NOTE
- Previous lab and stool tests negative  - No improvement with a trial of fiber  - Okay to use OTC Imodium   - I recommended that she undergo colonoscopy with random colon biopsy due to this ongoing stool change. She is moving soon, I advised that she establish care with a new provider in the area and then pursue this workup

## 2021-12-02 ENCOUNTER — TELEPHONE (OUTPATIENT)
Dept: ANTICOAGULATION | Facility: CLINIC | Age: 41
End: 2021-12-02
Payer: COMMERCIAL

## 2021-12-02 NOTE — TELEPHONE ENCOUNTER
ANTICOAGULATION     Jeimy Lynch is overdue for INR check.      Left message for patient to call and schedule lab appointment as soon as possible. If returning call, please schedule.     Washington Gaston RN

## 2021-12-10 ENCOUNTER — TELEPHONE (OUTPATIENT)
Dept: ANTICOAGULATION | Facility: CLINIC | Age: 41
End: 2021-12-10
Payer: COMMERCIAL

## 2021-12-10 DIAGNOSIS — D68.59 PRIMARY HYPERCOAGULABLE STATE (H): Primary | ICD-10-CM

## 2021-12-10 NOTE — TELEPHONE ENCOUNTER
ANTICOAGULATION  MANAGEMENT    Jeimy Lynch is being discharged from the [facility] ([facility]).    Reason for discharge: Talked with Jeimy who has moved and established with local clinic in her new location    Anticoagulation episode resolved and INR Standing order discontinued    If patient needs warfarin management in the future, please send a new referral    Karen Rivera RN

## 2022-07-23 ENCOUNTER — HEALTH MAINTENANCE LETTER (OUTPATIENT)
Age: 42
End: 2022-07-23

## 2022-10-01 ENCOUNTER — HEALTH MAINTENANCE LETTER (OUTPATIENT)
Age: 42
End: 2022-10-01

## 2022-10-31 DIAGNOSIS — D68.59 PRIMARY HYPERCOAGULABLE STATE (H): ICD-10-CM

## 2022-10-31 RX ORDER — WARFARIN SODIUM 5 MG/1
TABLET ORAL
Qty: 60 TABLET | Refills: 9 | Status: SHIPPED | OUTPATIENT
Start: 2022-10-31

## 2022-10-31 NOTE — TELEPHONE ENCOUNTER
"Routing refill request to provider for review/approval because:  Labs not current:  Most recent INR 11/9/21    Last Written Prescription Date:  10/7/21  Last Fill Quantity: 11,  # refills: 0   Last office visit provider:  11/9/22     Requested Prescriptions   Pending Prescriptions Disp Refills     warfarin ANTICOAGULANT (COUMADIN) 5 MG tablet [Pharmacy Med Name: WARFARIN SODIUM 5 MG TABLET 5 Tablet] 60 tablet 9     Sig: TAKE 1 TO 2 TABLETS BY MOUTH EVERY DAY       Vitamin K Antagonists Failed - 10/31/2022 11:22 AM        Failed - INR is within goal in the past 6 weeks     Confirm INR is within goal in the past 6 weeks.     Recent Labs   Lab Test 11/09/21  1620   INR 2.2*                       Passed - Recent (12 mo) or future (30 days) visit within the authorizing provider's specialty     Patient has had an office visit with the authorizing provider or a provider within the authorizing providers department within the previous 12 mos or has a future within next 30 days. See \"Patient Info\" tab in inbasket, or \"Choose Columns\" in Meds & Orders section of the refill encounter.              Passed - Medication is active on med list        Passed - Patient is 18 years of age or older        Passed - Patient is not pregnant        Passed - No positive pregnancy on file in past 12 months             Teresa Galindo RN 10/31/22 11:22 AM  "

## 2023-08-12 ENCOUNTER — HEALTH MAINTENANCE LETTER (OUTPATIENT)
Age: 43
End: 2023-08-12

## 2024-03-09 ENCOUNTER — HEALTH MAINTENANCE LETTER (OUTPATIENT)
Age: 44
End: 2024-03-09

## 2024-09-29 ENCOUNTER — HEALTH MAINTENANCE LETTER (OUTPATIENT)
Age: 44
End: 2024-09-29